# Patient Record
Sex: MALE | Race: WHITE | NOT HISPANIC OR LATINO | Employment: PART TIME | URBAN - NONMETROPOLITAN AREA
[De-identification: names, ages, dates, MRNs, and addresses within clinical notes are randomized per-mention and may not be internally consistent; named-entity substitution may affect disease eponyms.]

---

## 2018-03-01 ENCOUNTER — HISTORICAL (OUTPATIENT)
Dept: ADMINISTRATIVE | Facility: HOSPITAL | Age: 48
End: 2018-03-01

## 2018-06-15 ENCOUNTER — HISTORICAL (OUTPATIENT)
Dept: ADMINISTRATIVE | Facility: HOSPITAL | Age: 48
End: 2018-06-15

## 2018-07-03 ENCOUNTER — HISTORICAL (OUTPATIENT)
Dept: ADMINISTRATIVE | Facility: HOSPITAL | Age: 48
End: 2018-07-03

## 2019-10-06 ENCOUNTER — HISTORICAL (OUTPATIENT)
Dept: ADMINISTRATIVE | Facility: HOSPITAL | Age: 49
End: 2019-10-06

## 2022-05-01 ENCOUNTER — HISTORICAL (OUTPATIENT)
Dept: ADMINISTRATIVE | Facility: HOSPITAL | Age: 52
End: 2022-05-01

## 2022-05-08 ENCOUNTER — HISTORICAL (OUTPATIENT)
Dept: ADMINISTRATIVE | Facility: HOSPITAL | Age: 52
End: 2022-05-08

## 2022-05-09 ENCOUNTER — HISTORICAL (OUTPATIENT)
Dept: ADMINISTRATIVE | Facility: HOSPITAL | Age: 52
End: 2022-05-09

## 2022-05-11 ENCOUNTER — HISTORICAL (OUTPATIENT)
Dept: ADMINISTRATIVE | Facility: HOSPITAL | Age: 52
End: 2022-05-11

## 2023-06-12 ENCOUNTER — HOSPITAL ENCOUNTER (EMERGENCY)
Facility: HOSPITAL | Age: 53
Discharge: LEFT BEFORE DISCHARGE | End: 2023-06-12
Payer: MEDICAID

## 2023-06-12 VITALS
SYSTOLIC BLOOD PRESSURE: 121 MMHG | DIASTOLIC BLOOD PRESSURE: 72 MMHG | WEIGHT: 140 LBS | RESPIRATION RATE: 16 BRPM | HEIGHT: 65 IN | BODY MASS INDEX: 23.32 KG/M2 | TEMPERATURE: 98 F | OXYGEN SATURATION: 99 % | HEART RATE: 87 BPM

## 2023-06-12 DIAGNOSIS — R07.9 CHEST PAIN: ICD-10-CM

## 2023-06-12 DIAGNOSIS — F41.9 ANXIETY: Primary | ICD-10-CM

## 2023-06-12 LAB
ALBUMIN SERPL-MCNC: 4.3 G/DL (ref 3.4–5)
ALBUMIN/GLOB SERPL: 1.4 RATIO
ALP SERPL-CCNC: 87 UNIT/L (ref 50–144)
ALT SERPL-CCNC: 18 UNIT/L (ref 1–45)
ANION GAP SERPL CALC-SCNC: 3 MEQ/L (ref 2–13)
AST SERPL-CCNC: 27 UNIT/L (ref 17–59)
BASOPHILS # BLD AUTO: 0.05 X10(3)/MCL (ref 0.01–0.08)
BASOPHILS NFR BLD AUTO: 0.7 % (ref 0.1–1.2)
BILIRUBIN DIRECT+TOT PNL SERPL-MCNC: 0.5 MG/DL (ref 0–1)
BUN SERPL-MCNC: 11 MG/DL (ref 7–20)
CALCIUM SERPL-MCNC: 9.1 MG/DL (ref 8.4–10.2)
CHLORIDE SERPL-SCNC: 106 MMOL/L (ref 98–110)
CO2 SERPL-SCNC: 30 MMOL/L (ref 21–32)
CREAT SERPL-MCNC: 0.87 MG/DL (ref 0.66–1.25)
CREAT/UREA NIT SERPL: 13 (ref 12–20)
D DIMER PPP IA.FEU-MCNC: 0.29 MG/L (ref 0.19–0.5)
EOSINOPHIL # BLD AUTO: 0.09 X10(3)/MCL (ref 0.04–0.54)
EOSINOPHIL NFR BLD AUTO: 1.3 % (ref 0.7–7)
ERYTHROCYTE [DISTWIDTH] IN BLOOD BY AUTOMATED COUNT: 13.3 %
ETHANOL BLD-MCNC: <0.01 G/DL
ETHANOL SERPL-MCNC: <10 MG/DL
GFR SERPLBLD CREATININE-BSD FMLA CKD-EPI: >90 MLS/MIN/1.73/M2
GLOBULIN SER-MCNC: 3.1 GM/DL (ref 2–3.9)
GLUCOSE SERPL-MCNC: 109 MG/DL (ref 70–115)
HCT VFR BLD AUTO: 42.5 % (ref 36–52)
HGB BLD-MCNC: 13.9 G/DL (ref 13–18)
IMM GRANULOCYTES # BLD AUTO: 0 X10(3)/MCL (ref 0–0.03)
IMM GRANULOCYTES NFR BLD AUTO: 0 % (ref 0–0.5)
LYMPHOCYTES # BLD AUTO: 2.66 X10(3)/MCL (ref 1.32–3.57)
LYMPHOCYTES NFR BLD AUTO: 38.2 % (ref 20–55)
MCH RBC QN AUTO: 28.2 PG (ref 27–34)
MCHC RBC AUTO-ENTMCNC: 32.7 G/DL (ref 31–37)
MCV RBC AUTO: 86.2 FL (ref 79–99)
MONOCYTES # BLD AUTO: 0.59 X10(3)/MCL (ref 0.3–0.82)
MONOCYTES NFR BLD AUTO: 8.5 % (ref 4.7–12.5)
NEUTROPHILS # BLD AUTO: 3.57 X10(3)/MCL (ref 1.78–5.38)
NEUTROPHILS NFR BLD AUTO: 51.3 % (ref 37–73)
NRBC BLD AUTO-RTO: 0 %
PLATELET # BLD AUTO: 240 X10(3)/MCL (ref 140–371)
PMV BLD AUTO: 9.9 FL (ref 9.4–12.4)
POTASSIUM SERPL-SCNC: 3.6 MMOL/L (ref 3.5–5.1)
PROT SERPL-MCNC: 7.4 GM/DL (ref 6.3–8.2)
RBC # BLD AUTO: 4.93 X10(6)/MCL (ref 4–6)
SODIUM SERPL-SCNC: 139 MMOL/L (ref 135–145)
TROPONIN I SERPL-MCNC: <0.012 NG/ML (ref 0–0.03)
WBC # SPEC AUTO: 6.96 X10(3)/MCL (ref 4–11.5)

## 2023-06-12 PROCEDURE — 85025 COMPLETE CBC W/AUTO DIFF WBC: CPT

## 2023-06-12 PROCEDURE — 84484 ASSAY OF TROPONIN QUANT: CPT

## 2023-06-12 PROCEDURE — 93010 EKG 12-LEAD: ICD-10-PCS | Mod: ,,, | Performed by: INTERNAL MEDICINE

## 2023-06-12 PROCEDURE — 36415 COLL VENOUS BLD VENIPUNCTURE: CPT

## 2023-06-12 PROCEDURE — 82077 ASSAY SPEC XCP UR&BREATH IA: CPT

## 2023-06-12 PROCEDURE — 99285 EMERGENCY DEPT VISIT HI MDM: CPT | Mod: 25

## 2023-06-12 PROCEDURE — 93010 ELECTROCARDIOGRAM REPORT: CPT | Mod: ,,, | Performed by: INTERNAL MEDICINE

## 2023-06-12 PROCEDURE — 80053 COMPREHEN METABOLIC PANEL: CPT

## 2023-06-12 PROCEDURE — 85379 FIBRIN DEGRADATION QUANT: CPT

## 2023-06-12 PROCEDURE — 25000003 PHARM REV CODE 250

## 2023-06-12 PROCEDURE — 93005 ELECTROCARDIOGRAM TRACING: CPT

## 2023-06-12 RX ORDER — KETOROLAC TROMETHAMINE 30 MG/ML
60 INJECTION, SOLUTION INTRAMUSCULAR; INTRAVENOUS
Status: DISCONTINUED | OUTPATIENT
Start: 2023-06-12 | End: 2023-06-12

## 2023-06-12 RX ORDER — HYDROXYZINE PAMOATE 50 MG/1
50 CAPSULE ORAL
Status: COMPLETED | OUTPATIENT
Start: 2023-06-12 | End: 2023-06-12

## 2023-06-12 RX ADMIN — HYDROXYZINE PAMOATE 50 MG: 50 CAPSULE ORAL at 02:06

## 2023-06-12 NOTE — ED PROVIDER NOTES
Encounter Date: 6/12/2023       History     Chief Complaint   Patient presents with    Chest Pain    Loss of Consciousness     52-year-old male arrives via EMS.  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  He arrives still very anxious.  He has a long history of anxiety.  He denies any substance abuse.  There is no history of coronary artery disease or blood clots.  He was not ill before this.  There was no diaphoresis, nausea or vomiting.  He denies any shortness of breath.    The history is provided by the patient and the EMS personnel.   Review of patient's allergies indicates:   Allergen Reactions    Codeine     Penicillins     Tetracycline      Other reaction(s): O/E - respiratory distress     No past medical history on file.  No past surgical history on file.  No family history on file.     Review of Systems   Constitutional:  Negative for fever.   HENT:  Negative for sore throat.    Respiratory:  Positive for cough. Negative for shortness of breath.         Coughed up some phlegm after the near syncopal episode   Cardiovascular:  Positive for chest pain.   Gastrointestinal:  Negative for nausea.   Genitourinary:  Negative for dysuria.   Musculoskeletal:  Negative for back pain.   Skin:  Negative for rash.   Neurological:  Positive for weakness.        Suffered a near syncopal episode   Hematological:  Does not bruise/bleed easily.   Psychiatric/Behavioral:  The patient is nervous/anxious.    All other systems reviewed and are negative.    Physical Exam     Initial Vitals [06/12/23 0226]   BP Pulse Resp Temp SpO2   121/72 87 16 97.9 °F (36.6 °C) 99 %      MAP       --         Physical Exam    Nursing note and vitals reviewed.  Constitutional: Vital signs are normal. He appears well-developed and well-nourished. He is cooperative.   Thin male, appears older than stated age, appears quite anxious   HENT:   Head: Normocephalic and atraumatic.    Mouth/Throat: Oropharynx is clear and moist.   Eyes: Conjunctivae, EOM and lids are normal. Pupils are equal, round, and reactive to light.   Neck: Trachea normal. Neck supple.   Normal range of motion.  Cardiovascular:  Normal rate, regular rhythm, normal heart sounds and intact distal pulses.           Pulmonary/Chest: Breath sounds normal.   Abdominal: Abdomen is soft. Bowel sounds are normal.   Musculoskeletal:         General: Normal range of motion.      Cervical back: Normal, normal range of motion and neck supple.      Lumbar back: Normal.     Neurological: He is alert and oriented to person, place, and time. He has normal strength. Coordination normal.   Skin: Skin is warm, dry and intact. Capillary refill takes less than 2 seconds.   Psychiatric: His speech is normal and behavior is normal. Judgment and thought content normal. Cognition and memory are normal.   He is rather anxious upon arrival       ED Course   Procedures  Labs Reviewed   CBC WITH DIFFERENTIAL - Abnormal; Notable for the following components:       Result Value    IG# 0.00 (*)     All other components within normal limits   CBC W/ AUTO DIFFERENTIAL    Narrative:     The following orders were created for panel order CBC auto differential.  Procedure                               Abnormality         Status                     ---------                               -----------         ------                     CBC with Differential[[id number]]        Abnormal            Final result                 Please view results for these tests on the individual orders.   COMPREHENSIVE METABOLIC PANEL   TROPONIN I   D DIMER, QUANTITATIVE   DRUG SCREEN, URINE (BEAKER)   ALCOHOL,MEDICAL (ETHANOL)        ECG Results              EKG 12-lead (Preliminary result)  Result time 06/12/23 02:40:41      Wet Read by Martin Ortiz MD (06/12/23 02:40:41, [facility])    Normal sinus rhythm, heart rate 83, normal  axis, normal intervals, normal P waves, normal QRS, normal ST segments, normal T-waves.  This is a normal EKG.                                  Imaging Results              X-Ray Chest AP Portable (Preliminary result)  Result time 06/12/23 02:41:14      Wet Read by Martin Ortiz MD (06/12/23 02:41:14, [facility], Emergency Medicine)    Normal cardiac silhouette, clear lung fields, no pneumothorax                                     Medications   hydrOXYzine pamoate capsule 50 mg (50 mg Oral Given 6/12/23 0230)     Medical Decision Making:   Initial Assessment:   Chest pain, near-syncope, anxiety  Differential Diagnosis:   ACS, PE, panic attack, substance abuse  ED Management:  Cardiac workup, D-dimer, UDS  Vistaril po                        Clinical Impression:   Final diagnoses:  [R07.9] Chest pain  [F41.9] Anxiety (Primary)        ED Disposition Condition    AMA Stable                Martin Ortiz MD  06/12/23 0259

## 2023-06-16 ENCOUNTER — HOSPITAL ENCOUNTER (EMERGENCY)
Facility: HOSPITAL | Age: 53
Discharge: HOME | End: 2023-06-16
Attending: FAMILY MEDICINE
Payer: MEDICAID

## 2023-06-16 VITALS
TEMPERATURE: 97 F | OXYGEN SATURATION: 98 % | HEART RATE: 76 BPM | RESPIRATION RATE: 18 BRPM | SYSTOLIC BLOOD PRESSURE: 129 MMHG | DIASTOLIC BLOOD PRESSURE: 79 MMHG

## 2023-06-16 DIAGNOSIS — R06.00 DYSPNEA: ICD-10-CM

## 2023-06-16 DIAGNOSIS — T50.901A ACCIDENTAL OVERDOSE, INITIAL ENCOUNTER: Primary | ICD-10-CM

## 2023-06-16 DIAGNOSIS — R06.81 APNEA FOR GREATER THAN 15 SECONDS: ICD-10-CM

## 2023-06-16 LAB
ABS NEUT CALC (OHS): 3.54 X10(3)/MCL (ref 2.1–9.2)
ALBUMIN SERPL-MCNC: 4.5 G/DL (ref 3.4–5)
ALBUMIN/GLOB SERPL: 1.4 RATIO
ALP SERPL-CCNC: 95 UNIT/L (ref 50–144)
ALT SERPL-CCNC: 24 UNIT/L (ref 1–45)
ANION GAP SERPL CALC-SCNC: 8 MEQ/L (ref 2–13)
AST SERPL-CCNC: 38 UNIT/L (ref 17–59)
BASOPHILS NFR BLD MANUAL: 0.14 X10(3)/MCL (ref 0–0.2)
BASOPHILS NFR BLD MANUAL: 1 % (ref 0–2)
BILIRUBIN DIRECT+TOT PNL SERPL-MCNC: 0.5 MG/DL (ref 0–1)
BUN SERPL-MCNC: 15 MG/DL (ref 7–20)
CALCIUM SERPL-MCNC: 8.7 MG/DL (ref 8.4–10.2)
CHLORIDE SERPL-SCNC: 103 MMOL/L (ref 98–110)
CK SERPL-CCNC: 75 U/L (ref 55–170)
CO2 SERPL-SCNC: 29 MMOL/L (ref 21–32)
CREAT SERPL-MCNC: 0.83 MG/DL (ref 0.66–1.25)
CREAT/UREA NIT SERPL: 18 (ref 12–20)
EOSINOPHIL NFR BLD MANUAL: 0.14 X10(3)/MCL (ref 0–0.9)
EOSINOPHIL NFR BLD MANUAL: 1 % (ref 0–8)
ERYTHROCYTE [DISTWIDTH] IN BLOOD BY AUTOMATED COUNT: 13.2 %
GFR SERPLBLD CREATININE-BSD FMLA CKD-EPI: >90 MLS/MIN/1.73/M2
GLOBULIN SER-MCNC: 3.3 GM/DL (ref 2–3.9)
GLUCOSE SERPL-MCNC: 177 MG/DL (ref 70–115)
HCT VFR BLD AUTO: 45.7 % (ref 36–52)
HGB BLD-MCNC: 14.7 G/DL (ref 13–18)
IMM GRANULOCYTES # BLD AUTO: 0.04 X10(3)/MCL (ref 0–0.03)
IMM GRANULOCYTES NFR BLD AUTO: 0.3 % (ref 0–0.5)
LYMPHOCYTES NFR BLD MANUAL: 10.2 X10(3)/MCL
LYMPHOCYTES NFR BLD MANUAL: 72 % (ref 13–40)
MCH RBC QN AUTO: 28.4 PG (ref 27–34)
MCHC RBC AUTO-ENTMCNC: 32.2 G/DL (ref 31–37)
MCV RBC AUTO: 88.2 FL (ref 79–99)
MONOCYTES NFR BLD MANUAL: 0.14 X10(3)/MCL (ref 0.1–1.3)
MONOCYTES NFR BLD MANUAL: 1 % (ref 2–11)
NEUTROPHILS NFR BLD MANUAL: 25 % (ref 47–80)
NRBC BLD AUTO-RTO: 0 %
PLATELET # BLD AUTO: 292 X10(3)/MCL (ref 140–371)
PMV BLD AUTO: 10 FL (ref 9.4–12.4)
POTASSIUM SERPL-SCNC: 4.1 MMOL/L (ref 3.5–5.1)
PROT SERPL-MCNC: 7.8 GM/DL (ref 6.3–8.2)
RBC # BLD AUTO: 5.18 X10(6)/MCL (ref 4–6)
SODIUM SERPL-SCNC: 140 MMOL/L (ref 135–145)
WBC # SPEC AUTO: 14.16 X10(3)/MCL (ref 4–11.5)

## 2023-06-16 PROCEDURE — 85027 COMPLETE CBC AUTOMATED: CPT | Performed by: FAMILY MEDICINE

## 2023-06-16 PROCEDURE — 63600175 PHARM REV CODE 636 W HCPCS: Performed by: FAMILY MEDICINE

## 2023-06-16 PROCEDURE — 80053 COMPREHEN METABOLIC PANEL: CPT | Performed by: FAMILY MEDICINE

## 2023-06-16 PROCEDURE — 93010 EKG 12-LEAD: ICD-10-PCS | Mod: ,,, | Performed by: INTERNAL MEDICINE

## 2023-06-16 PROCEDURE — 82550 ASSAY OF CK (CPK): CPT | Performed by: FAMILY MEDICINE

## 2023-06-16 PROCEDURE — 96375 TX/PRO/DX INJ NEW DRUG ADDON: CPT

## 2023-06-16 PROCEDURE — 96374 THER/PROPH/DIAG INJ IV PUSH: CPT

## 2023-06-16 PROCEDURE — 99291 CRITICAL CARE FIRST HOUR: CPT

## 2023-06-16 PROCEDURE — 36415 COLL VENOUS BLD VENIPUNCTURE: CPT | Performed by: FAMILY MEDICINE

## 2023-06-16 PROCEDURE — 93005 ELECTROCARDIOGRAM TRACING: CPT

## 2023-06-16 PROCEDURE — 93010 ELECTROCARDIOGRAM REPORT: CPT | Mod: ,,, | Performed by: INTERNAL MEDICINE

## 2023-06-16 RX ORDER — NALOXONE HCL 0.4 MG/ML
2 VIAL (ML) INJECTION
Status: COMPLETED | OUTPATIENT
Start: 2023-06-16 | End: 2023-06-16

## 2023-06-16 RX ORDER — NALOXONE HYDROCHLORIDE 1 MG/ML
INJECTION INTRAMUSCULAR; INTRAVENOUS; SUBCUTANEOUS
Status: DISCONTINUED
Start: 2023-06-16 | End: 2023-06-17 | Stop reason: WASHOUT

## 2023-06-16 RX ORDER — NALOXONE HYDROCHLORIDE 1 MG/ML
INJECTION INTRAMUSCULAR; INTRAVENOUS; SUBCUTANEOUS
Status: DISCONTINUED
Start: 2023-06-16 | End: 2023-06-16 | Stop reason: HOSPADM

## 2023-06-16 RX ORDER — NALOXONE HYDROCHLORIDE 4 MG/.1ML
SPRAY NASAL
Qty: 1 EACH | Refills: 11 | Status: SHIPPED | OUTPATIENT
Start: 2023-06-16

## 2023-06-16 RX ORDER — NALOXONE HCL 0.4 MG/ML
VIAL (ML) INJECTION CODE/TRAUMA/SEDATION MEDICATION
Status: COMPLETED | OUTPATIENT
Start: 2023-06-16 | End: 2023-06-16

## 2023-06-16 RX ADMIN — NALOXONE HYDROCHLORIDE 2 MG: 0.4 INJECTION, SOLUTION INTRAMUSCULAR; INTRAVENOUS; SUBCUTANEOUS at 06:06

## 2023-06-16 NOTE — ED PROVIDER NOTES
"Encounter Date: 6/16/2023       History     Chief Complaint   Patient presents with    Drug Overdose    unresponsive     Pt found unresponsive in backseat of POV in ambulance bay.  Pt's friend stated "I don't know what happened".  Pt arrived pulseless and unable to breath on his own.  Code called and CPR started.       Patient dropped by associated with no history provided other than patient became unresponsive.  Patient arrives apneic, cyanotic.  His pupils are constricted.  Patient was immediately brought back, placed on cardiac monitor.  Pulse was palpated.  He was tachycardic.  Supplemental oxygen via bag mask valve was initiated.  Intravenous access was obtained through the external jugular and 2 mg of Narcan were given.  The patient had a prompt response, began breathing spontaneously, and became interactive.  After awakening from the Narcan, the patient states he has no suicidal ideations.  He states he is not a regular heroin user.  He states he has chronic back pain and shortness some heroin and took it today.      Review of patient's allergies indicates:   Allergen Reactions    Codeine     Penicillins     Tetracycline      Other reaction(s): O/E - respiratory distress     History reviewed. No pertinent past medical history.  History reviewed. No pertinent surgical history.  History reviewed. No pertinent family history.     Review of Systems   Constitutional:         Unresponsive   Respiratory:          Cyanotic, apneic   Skin:         diaphoretic   Neurological:         Unresponsive     Physical Exam     Initial Vitals   BP Pulse Resp Temp SpO2   06/16/23 1835 06/16/23 1835 06/16/23 1835 06/16/23 1835 06/16/23 2000   132/84 89 13 97.4 °F (36.3 °C) 98 %      MAP       --                Physical Exam    Constitutional:   Apneic, unresponsive, cyanotic   HENT:   Head: Normocephalic and atraumatic.   Eyes:   Pupils constricted   Cardiovascular:            Tachycardic, no m/g/r   Pulmonary/Chest:   apneic "   Abdominal: Abdomen is soft. Bowel sounds are normal.     Neurological:   unresponsive   Skin: Skin is warm and dry.       ED Course   Critical Care    Date/Time: 6/16/2023 6:24 PM  Performed by: Laureano Nelson MD  Authorized by: Laureano Nelson MD   Direct patient critical care time: 18 minutes  Additional history critical care time: 5 minutes  Ordering / reviewing critical care time: 10 minutes  Documentation critical care time: 10 minutes  Total critical care time (exclusive of procedural time) : 43 minutes  Critical care was necessary to treat or prevent imminent or life-threatening deterioration of the following conditions: respiratory failure and toxidrome.  Critical care was time spent personally by me on the following activities: development of treatment plan with patient or surrogate, interpretation of cardiac output measurements, evaluation of patient's response to treatment, examination of patient, obtaining history from patient or surrogate, ordering and performing treatments and interventions, ordering and review of laboratory studies, ordering and review of radiographic studies, pulse oximetry, re-evaluation of patient's condition and review of old charts.      Labs Reviewed   COMPREHENSIVE METABOLIC PANEL - Abnormal; Notable for the following components:       Result Value    Glucose Level 177 (*)     All other components within normal limits   CBC WITH DIFFERENTIAL - Abnormal; Notable for the following components:    WBC 14.16 (*)     IG# 0.04 (*)     All other components within normal limits   MANUAL DIFFERENTIAL - Abnormal; Notable for the following components:    Neutrophils % 25 (*)     Lymphs % 72 (*)     Monocytes % 1 (*)     Lymphs Abs 10.1952 (*)     All other components within normal limits   CK - Normal   CBC W/ AUTO DIFFERENTIAL    Narrative:     The following orders were created for panel order CBC auto differential.  Procedure                               Abnormality          Status                     ---------                               -----------         ------                     CBC with Differential[[id number]]        Abnormal            Final result               Manual Differential[[id number]]          Abnormal            Final result                 Please view results for these tests on the individual orders.     EKG Readings: (Independently Interpreted)   Initial Reading: No STEMI. Rhythm: Normal Sinus Rhythm. Heart Rate: 87. Ectopy: No Ectopy. ST Segments: Normal ST Segments. T Waves: Normal.   ECG Results              EKG 12-lead (Final result)  Result time 06/20/23 12:06:01      Final result by Interface, Lab In [facility] (06/20/23 12:06:01)                   Narrative:    Test Reason : R06.81,    Vent. Rate : 087 BPM     Atrial Rate : 087 BPM     P-R Int : 128 ms          QRS Dur : 080 ms      QT Int : 366 ms       P-R-T Axes : 071 054 045 degrees     QTc Int : 440 ms    Normal sinus rhythm  Normal ECG  When compared with ECG of 12-JUN-2023 02:31,  No significant change was found  Confirmed by Heriberto Rose MD ([id number]) on 6/20/2023 12:05:49 PM    Referred By: [email or web address]   SELF           Confirmed By:Heriberto Rose MD                                  Imaging Results              X-Ray Chest AP Portable (Final result)  Result time 06/16/23 20:14:27      Final result by Jimbo Rey III, MD (06/16/23 20:14:27)                   Impression:      1. I see no lobar or segmental infiltrates or other significant abnormalities.      Electronically signed by: Jimbo Rey  Date:    06/16/2023  Time:    20:14               Narrative:    EXAMINATION:  STUDY: XR CHEST AP PORTABLE    CLINICAL HISTORY AND TECHNIQUE:  RT Nicole on 6/16/2023  7:00 PM    CLINICAL HX: ER    PT FOUND UNRESPONSIVE TODAY    PT RESPONDED WELL TO NARCAN    PMH: CV-    TECH: 1 VIEW OF CHEST    TECHNOLOGIST: HOLLEY    COMPARISON:  06/12/2023    FINDINGS:  The cardiac, hilar, and mediastinal contours appear unremarkable.I  see no lobar or segmental infiltrates.No significant pleural effusions are noted.No significant musculoskeletal or vascular abnormalities are appreciated.                        Wet Read by Laureano Nelson MD (06/16/23 19:05:39, [facility], Emergency Medicine)    Neg for acute findings                                     Medications   naloxone 0.4 mg/mL injection (2 mg Intravenous Given 6/16/23 1831)   naloxone 0.4 mg/mL injection 2 mg (2 mg Nasal Given 6/16/23 1830)     Medical Decision Making:   Differential Diagnosis:   Intentional overdose, suicide attempt, anoxia,   Clinical Tests:   Lab Tests: Ordered and Reviewed  The following lab test(s) were unremarkable: CBC and CMP  Radiological Study: Ordered and Reviewed  Medical Tests: Ordered and Reviewed  ED Management:  Pt given d/c prescription for Narcan.  Referral made to substance use coordinator.                          Clinical Impression:   Final diagnoses:  [R06.00] Dyspnea  [R06.81] Apnea for greater than 15 seconds  [T50.901A] Accidental overdose, initial encounter (Primary)        ED Disposition Condition    Discharge Stable          ED Prescriptions       Medication Sig Dispense Start Date End Date Auth. Provider    naloxone (NARCAN) 4 mg/actuation Spry 4mg by nasal route as needed for opioid overdose; may repeat every 2-3 minutes in alternating nostrils until medical help arrives. Call 911 1 each 6/16/2023 -- Laureano Nelson MD          Follow-up Information    None          Laureano Nelson MD  06/16/23 2015       Laureano Nelson MD  06/30/23 1076     No significant past surgical history

## 2025-02-25 ENCOUNTER — HOSPITAL ENCOUNTER (INPATIENT)
Facility: HOSPITAL | Age: 55
LOS: 3 days | Discharge: HOME | DRG: 386 | End: 2025-02-28
Attending: EMERGENCY MEDICINE | Admitting: INTERNAL MEDICINE
Payer: MEDICAID

## 2025-02-25 DIAGNOSIS — K52.9 ACUTE HEMORRHAGIC COLITIS: Primary | ICD-10-CM

## 2025-02-25 DIAGNOSIS — K51.50 LEFT SIDED COLITIS WITHOUT COMPLICATIONS: ICD-10-CM

## 2025-02-25 DIAGNOSIS — R07.9 CHEST PAIN: ICD-10-CM

## 2025-02-25 DIAGNOSIS — R00.1 BRADYCARDIA: ICD-10-CM

## 2025-02-25 DIAGNOSIS — K92.2 LOWER GI BLEED: ICD-10-CM

## 2025-02-25 PROBLEM — F17.200 TOBACCO DEPENDENCY: Status: ACTIVE | Noted: 2025-02-25

## 2025-02-25 LAB
ALBUMIN SERPL-MCNC: 4.1 G/DL (ref 3.5–5)
ALBUMIN/GLOB SERPL: 1.2 RATIO (ref 1.1–2)
ALP SERPL-CCNC: 94 UNIT/L (ref 40–150)
ALT SERPL-CCNC: 7 UNIT/L (ref 0–55)
ANION GAP SERPL CALC-SCNC: 9 MEQ/L
AST SERPL-CCNC: 15 UNIT/L (ref 5–34)
BACTERIA #/AREA URNS AUTO: ABNORMAL /HPF
BASOPHILS # BLD AUTO: 0.05 X10(3)/MCL
BASOPHILS NFR BLD AUTO: 0.5 %
BILIRUB SERPL-MCNC: 1 MG/DL
BILIRUB UR QL STRIP.AUTO: ABNORMAL
BUN SERPL-MCNC: 8.1 MG/DL (ref 8.4–25.7)
CALCIUM SERPL-MCNC: 9.1 MG/DL (ref 8.4–10.2)
CHLORIDE SERPL-SCNC: 101 MMOL/L (ref 98–107)
CLARITY UR: CLEAR
CO2 SERPL-SCNC: 28 MMOL/L (ref 22–29)
COLOR UR AUTO: ABNORMAL
CREAT SERPL-MCNC: 0.85 MG/DL (ref 0.72–1.25)
CREAT/UREA NIT SERPL: 10
EOSINOPHIL # BLD AUTO: 0.07 X10(3)/MCL (ref 0–0.9)
EOSINOPHIL NFR BLD AUTO: 0.6 %
ERYTHROCYTE [DISTWIDTH] IN BLOOD BY AUTOMATED COUNT: 13.1 % (ref 11.5–17)
GFR SERPLBLD CREATININE-BSD FMLA CKD-EPI: >60 ML/MIN/1.73/M2
GLOBULIN SER-MCNC: 3.3 GM/DL (ref 2.4–3.5)
GLUCOSE SERPL-MCNC: 105 MG/DL (ref 74–100)
GLUCOSE UR QL STRIP: NEGATIVE
GROUP & RH: NORMAL
HCT VFR BLD AUTO: 48 % (ref 42–52)
HEMOCCULT SP1 STL QL: POSITIVE
HGB BLD-MCNC: 16 G/DL (ref 14–18)
HGB UR QL STRIP: NEGATIVE
IMM GRANULOCYTES # BLD AUTO: 0.02 X10(3)/MCL (ref 0–0.04)
IMM GRANULOCYTES NFR BLD AUTO: 0.2 %
INDIRECT COOMBS: NORMAL
INR PPP: 1.1 (ref 2–3)
KETONES UR QL STRIP: ABNORMAL
LEUKOCYTE ESTERASE UR QL STRIP: NEGATIVE
LIPASE SERPL-CCNC: 10 U/L
LYMPHOCYTES # BLD AUTO: 2.67 X10(3)/MCL (ref 0.6–4.6)
LYMPHOCYTES NFR BLD AUTO: 24.5 %
MCH RBC QN AUTO: 29.6 PG (ref 27–31)
MCHC RBC AUTO-ENTMCNC: 33.3 G/DL (ref 33–36)
MCV RBC AUTO: 88.7 FL (ref 80–94)
MONOCYTES # BLD AUTO: 0.74 X10(3)/MCL (ref 0.1–1.3)
MONOCYTES NFR BLD AUTO: 6.8 %
MUCOUS THREADS URNS QL MICRO: ABNORMAL /LPF
NEUTROPHILS # BLD AUTO: 7.34 X10(3)/MCL (ref 2.1–9.2)
NEUTROPHILS NFR BLD AUTO: 67.4 %
NITRITE UR QL STRIP: NEGATIVE
NRBC BLD AUTO-RTO: 0 %
PH UR STRIP: 6 [PH]
PLATELET # BLD AUTO: 225 X10(3)/MCL (ref 130–400)
PMV BLD AUTO: 9.9 FL (ref 7.4–10.4)
POTASSIUM SERPL-SCNC: 3.8 MMOL/L (ref 3.5–5.1)
PROT SERPL-MCNC: 7.4 GM/DL (ref 6.4–8.3)
PROT UR QL STRIP: ABNORMAL
PROTHROMBIN TIME: 14.7 SECONDS (ref 11.7–14.5)
RBC # BLD AUTO: 5.41 X10(6)/MCL (ref 4.7–6.1)
RBC #/AREA URNS AUTO: ABNORMAL /HPF
SODIUM SERPL-SCNC: 138 MMOL/L (ref 136–145)
SP GR UR STRIP.AUTO: >=1.03 (ref 1–1.03)
SPECIMEN OUTDATE: NORMAL
SQUAMOUS #/AREA URNS AUTO: ABNORMAL /HPF
UROBILINOGEN UR STRIP-ACNC: 0.2
WBC # BLD AUTO: 10.89 X10(3)/MCL (ref 4.5–11.5)
WBC #/AREA URNS AUTO: ABNORMAL /HPF

## 2025-02-25 PROCEDURE — 99285 EMERGENCY DEPT VISIT HI MDM: CPT | Mod: 25

## 2025-02-25 PROCEDURE — 86901 BLOOD TYPING SEROLOGIC RH(D): CPT | Performed by: EMERGENCY MEDICINE

## 2025-02-25 PROCEDURE — 83690 ASSAY OF LIPASE: CPT | Performed by: EMERGENCY MEDICINE

## 2025-02-25 PROCEDURE — 63600175 PHARM REV CODE 636 W HCPCS: Mod: JZ,TB | Performed by: EMERGENCY MEDICINE

## 2025-02-25 PROCEDURE — 80053 COMPREHEN METABOLIC PANEL: CPT | Performed by: EMERGENCY MEDICINE

## 2025-02-25 PROCEDURE — 81001 URINALYSIS AUTO W/SCOPE: CPT | Performed by: EMERGENCY MEDICINE

## 2025-02-25 PROCEDURE — 85610 PROTHROMBIN TIME: CPT | Performed by: EMERGENCY MEDICINE

## 2025-02-25 PROCEDURE — 25500020 PHARM REV CODE 255: Performed by: EMERGENCY MEDICINE

## 2025-02-25 PROCEDURE — 63600175 PHARM REV CODE 636 W HCPCS: Performed by: STUDENT IN AN ORGANIZED HEALTH CARE EDUCATION/TRAINING PROGRAM

## 2025-02-25 PROCEDURE — 82272 OCCULT BLD FECES 1-3 TESTS: CPT | Performed by: EMERGENCY MEDICINE

## 2025-02-25 PROCEDURE — 96365 THER/PROPH/DIAG IV INF INIT: CPT

## 2025-02-25 PROCEDURE — 25000003 PHARM REV CODE 250: Performed by: STUDENT IN AN ORGANIZED HEALTH CARE EDUCATION/TRAINING PROGRAM

## 2025-02-25 PROCEDURE — 25000003 PHARM REV CODE 250: Performed by: EMERGENCY MEDICINE

## 2025-02-25 PROCEDURE — 85025 COMPLETE CBC W/AUTO DIFF WBC: CPT | Performed by: EMERGENCY MEDICINE

## 2025-02-25 PROCEDURE — 11000001 HC ACUTE MED/SURG PRIVATE ROOM

## 2025-02-25 PROCEDURE — 96361 HYDRATE IV INFUSION ADD-ON: CPT

## 2025-02-25 RX ORDER — SODIUM CHLORIDE 0.9 % (FLUSH) 0.9 %
10 SYRINGE (ML) INJECTION
Status: DISCONTINUED | OUTPATIENT
Start: 2025-02-25 | End: 2025-02-28 | Stop reason: HOSPADM

## 2025-02-25 RX ORDER — IBUPROFEN 200 MG
24 TABLET ORAL
Status: DISCONTINUED | OUTPATIENT
Start: 2025-02-25 | End: 2025-02-28 | Stop reason: HOSPADM

## 2025-02-25 RX ORDER — SIMETHICONE 80 MG
1 TABLET,CHEWABLE ORAL 4 TIMES DAILY PRN
Status: DISCONTINUED | OUTPATIENT
Start: 2025-02-25 | End: 2025-02-28 | Stop reason: HOSPADM

## 2025-02-25 RX ORDER — GLUCAGON 1 MG
1 KIT INJECTION
Status: DISCONTINUED | OUTPATIENT
Start: 2025-02-25 | End: 2025-02-28 | Stop reason: HOSPADM

## 2025-02-25 RX ORDER — CIPROFLOXACIN 2 MG/ML
400 INJECTION, SOLUTION INTRAVENOUS
Status: DISCONTINUED | OUTPATIENT
Start: 2025-02-25 | End: 2025-02-25

## 2025-02-25 RX ORDER — ALUMINUM HYDROXIDE, MAGNESIUM HYDROXIDE, AND SIMETHICONE 1200; 120; 1200 MG/30ML; MG/30ML; MG/30ML
30 SUSPENSION ORAL 4 TIMES DAILY PRN
Status: DISCONTINUED | OUTPATIENT
Start: 2025-02-25 | End: 2025-02-28 | Stop reason: HOSPADM

## 2025-02-25 RX ORDER — TALC
6 POWDER (GRAM) TOPICAL NIGHTLY PRN
Status: DISCONTINUED | OUTPATIENT
Start: 2025-02-25 | End: 2025-02-25

## 2025-02-25 RX ORDER — ONDANSETRON HYDROCHLORIDE 8 MG/1
8 TABLET, FILM COATED ORAL 2 TIMES DAILY PRN
COMMUNITY
Start: 2025-01-14

## 2025-02-25 RX ORDER — CIPROFLOXACIN 2 MG/ML
400 INJECTION, SOLUTION INTRAVENOUS
Status: COMPLETED | OUTPATIENT
Start: 2025-02-25 | End: 2025-02-25

## 2025-02-25 RX ORDER — SODIUM CHLORIDE, SODIUM LACTATE, POTASSIUM CHLORIDE, CALCIUM CHLORIDE 600; 310; 30; 20 MG/100ML; MG/100ML; MG/100ML; MG/100ML
INJECTION, SOLUTION INTRAVENOUS CONTINUOUS
Status: ACTIVE | OUTPATIENT
Start: 2025-02-25 | End: 2025-02-26

## 2025-02-25 RX ORDER — SODIUM CHLORIDE 0.9 % (FLUSH) 0.9 %
10 SYRINGE (ML) INJECTION
Status: DISCONTINUED | OUTPATIENT
Start: 2025-02-25 | End: 2025-02-25

## 2025-02-25 RX ORDER — IBUPROFEN 200 MG
16 TABLET ORAL
Status: DISCONTINUED | OUTPATIENT
Start: 2025-02-25 | End: 2025-02-28 | Stop reason: HOSPADM

## 2025-02-25 RX ORDER — BISACODYL 10 MG/1
10 SUPPOSITORY RECTAL DAILY PRN
Status: DISCONTINUED | OUTPATIENT
Start: 2025-02-25 | End: 2025-02-28 | Stop reason: HOSPADM

## 2025-02-25 RX ORDER — ONDANSETRON HYDROCHLORIDE 2 MG/ML
4 INJECTION, SOLUTION INTRAVENOUS EVERY 8 HOURS PRN
Status: DISCONTINUED | OUTPATIENT
Start: 2025-02-25 | End: 2025-02-28 | Stop reason: HOSPADM

## 2025-02-25 RX ORDER — QUETIAPINE FUMARATE 100 MG/1
100 TABLET, FILM COATED ORAL NIGHTLY
Status: DISCONTINUED | OUTPATIENT
Start: 2025-02-25 | End: 2025-02-28 | Stop reason: HOSPADM

## 2025-02-25 RX ORDER — BUPRENORPHINE HYDROCHLORIDE AND NALOXONE HYDROCHLORIDE DIHYDRATE 2; .5 MG/1; MG/1
8 TABLET SUBLINGUAL 2 TIMES DAILY PRN
COMMUNITY

## 2025-02-25 RX ORDER — NALOXONE HCL 0.4 MG/ML
0.02 VIAL (ML) INJECTION
Status: DISCONTINUED | OUTPATIENT
Start: 2025-02-25 | End: 2025-02-28 | Stop reason: HOSPADM

## 2025-02-25 RX ORDER — METRONIDAZOLE 500 MG/100ML
500 INJECTION, SOLUTION INTRAVENOUS
Status: DISCONTINUED | OUTPATIENT
Start: 2025-02-25 | End: 2025-02-25

## 2025-02-25 RX ORDER — SODIUM CHLORIDE 9 MG/ML
1000 INJECTION, SOLUTION INTRAVENOUS
Status: DISCONTINUED | OUTPATIENT
Start: 2025-02-25 | End: 2025-02-25

## 2025-02-25 RX ORDER — GABAPENTIN 300 MG/1
300 CAPSULE ORAL NIGHTLY
Status: DISCONTINUED | OUTPATIENT
Start: 2025-02-26 | End: 2025-02-28 | Stop reason: HOSPADM

## 2025-02-25 RX ORDER — KETOROLAC TROMETHAMINE 30 MG/ML
30 INJECTION, SOLUTION INTRAMUSCULAR; INTRAVENOUS
Status: COMPLETED | OUTPATIENT
Start: 2025-02-25 | End: 2025-02-25

## 2025-02-25 RX ORDER — IBUPROFEN 200 MG
1 TABLET ORAL DAILY
Status: DISCONTINUED | OUTPATIENT
Start: 2025-02-26 | End: 2025-02-28 | Stop reason: HOSPADM

## 2025-02-25 RX ORDER — ACETAMINOPHEN 325 MG/1
650 TABLET ORAL EVERY 4 HOURS PRN
Status: DISCONTINUED | OUTPATIENT
Start: 2025-02-25 | End: 2025-02-28 | Stop reason: HOSPADM

## 2025-02-25 RX ORDER — POLYETHYLENE GLYCOL 3350 17 G/17G
17 POWDER, FOR SOLUTION ORAL 3 TIMES DAILY PRN
Status: DISCONTINUED | OUTPATIENT
Start: 2025-02-25 | End: 2025-02-28 | Stop reason: HOSPADM

## 2025-02-25 RX ORDER — METRONIDAZOLE 500 MG/100ML
500 INJECTION, SOLUTION INTRAVENOUS
Status: COMPLETED | OUTPATIENT
Start: 2025-02-25 | End: 2025-02-25

## 2025-02-25 RX ORDER — TALC
9 POWDER (GRAM) TOPICAL NIGHTLY PRN
Status: DISCONTINUED | OUTPATIENT
Start: 2025-02-25 | End: 2025-02-28 | Stop reason: HOSPADM

## 2025-02-25 RX ORDER — GABAPENTIN 300 MG/1
300 CAPSULE ORAL NIGHTLY
COMMUNITY
Start: 2025-01-14

## 2025-02-25 RX ORDER — IPRATROPIUM BROMIDE AND ALBUTEROL SULFATE 2.5; .5 MG/3ML; MG/3ML
3 SOLUTION RESPIRATORY (INHALATION) EVERY 6 HOURS PRN
Status: DISCONTINUED | OUTPATIENT
Start: 2025-02-25 | End: 2025-02-28 | Stop reason: HOSPADM

## 2025-02-25 RX ORDER — QUETIAPINE FUMARATE 100 MG/1
100 TABLET, FILM COATED ORAL NIGHTLY
COMMUNITY
Start: 2025-01-14

## 2025-02-25 RX ADMIN — CIPROFLOXACIN 400 MG: 2 INJECTION, SOLUTION INTRAVENOUS at 02:02

## 2025-02-25 RX ADMIN — AZITHROMYCIN MONOHYDRATE 500 MG: 500 INJECTION, POWDER, LYOPHILIZED, FOR SOLUTION INTRAVENOUS at 09:02

## 2025-02-25 RX ADMIN — SODIUM CHLORIDE, POTASSIUM CHLORIDE, SODIUM LACTATE AND CALCIUM CHLORIDE: 600; 310; 30; 20 INJECTION, SOLUTION INTRAVENOUS at 09:02

## 2025-02-25 RX ADMIN — IOHEXOL 100 ML: 350 INJECTION, SOLUTION INTRAVENOUS at 01:02

## 2025-02-25 RX ADMIN — METRONIDAZOLE 500 MG: 500 INJECTION, SOLUTION INTRAVENOUS at 03:02

## 2025-02-25 RX ADMIN — SODIUM CHLORIDE 1000 ML: 9 INJECTION, SOLUTION INTRAVENOUS at 01:02

## 2025-02-25 RX ADMIN — KETOROLAC TROMETHAMINE 30 MG: 30 INJECTION, SOLUTION INTRAMUSCULAR at 02:02

## 2025-02-25 NOTE — ED PROVIDER NOTES
Encounter Date: 2/25/2025       History     Chief Complaint   Patient presents with    Rectal Bleeding     HPI  54 year male here with c/o rectal bleeding in association with LLQ pain that began slowly over last week. He gives me hx of having stomach cramping for last week. Then yesterday at work had severe pain with normal and then loose bowel movement that was followed by blood. He passed dark blood without stool today while on the toilet and still had pain. He has hx of a gi bleed (says took a few NSAID last week but none recently and none on regular basiss). He also has hx of having a surgery on his colon due to bowel perforation from trauma about 10 years ago. He has never had a colonoscopy that he knows of. He denies alcohol use but does take suboxone. Smokes cigarettes daily  Review of patient's allergies indicates:   Allergen Reactions    Codeine     Penicillins     Tetracycline      Other reaction(s): O/E - respiratory distress     Past Medical History:   Diagnosis Date    Depression     GERD (gastroesophageal reflux disease)      Past Surgical History:   Procedure Laterality Date    ABDOMINAL SURGERY      APPENDECTOMY      finger amputations Right      No family history on file.  Social History[1]  Review of Systems   Constitutional:  Positive for activity change and fatigue.   HENT: Negative.     Respiratory: Negative.     Cardiovascular: Negative.    Gastrointestinal:  Positive for abdominal pain, blood in stool, diarrhea and vomiting.   Musculoskeletal: Negative.    Skin: Negative.    Neurological:  Positive for weakness.   All other systems reviewed and are negative.      Physical Exam     Initial Vitals [02/25/25 1142]   BP Pulse Resp Temp SpO2   (!) 147/78 78 16 98.1 °F (36.7 °C) 100 %      MAP       --         Physical Exam    Nursing note and vitals reviewed.  Constitutional:   Thin, small stature   HENT:   Head: Normocephalic and atraumatic.   Poor oral hygiene   Eyes: EOM are normal. Pupils are  equal, round, and reactive to light.   Cardiovascular:  Normal rate, regular rhythm and normal heart sounds.           Pulmonary/Chest: Breath sounds normal.   Abdominal:   Mdline scar below belly button, +tender LLQ and left of pubic midline region, no guarding / flat abdomen / no rebound but tender on deep palpation   Musculoskeletal:         General: Normal range of motion.     Neurological: He is alert and oriented to person, place, and time.   Skin: Skin is warm and dry.         ED Course   Procedures  Labs Reviewed   COMPREHENSIVE METABOLIC PANEL - Abnormal       Result Value    Sodium 138      Potassium 3.8      Chloride 101      CO2 28      Glucose 105 (*)     Blood Urea Nitrogen 8.1 (*)     Creatinine 0.85      Calcium 9.1      Protein Total 7.4      Albumin 4.1      Globulin 3.3      Albumin/Globulin Ratio 1.2      Bilirubin Total 1.0      ALP 94      ALT 7      AST 15      eGFR >60      Anion Gap 9.0      BUN/Creatinine Ratio 10     URINALYSIS, REFLEX TO URINE CULTURE - Abnormal    Color, UA Dark Yellow      Appearance, UA Clear      Specific Gravity, UA >=1.030      pH, UA 6.0      Protein, UA Trace (*)     Glucose, UA Negative      Ketones, UA Trace (*)     Blood, UA Negative      Bilirubin, UA Moderate (*)     Urobilinogen, UA 0.2      Nitrites, UA Negative      Leukocyte Esterase, UA Negative     PROTIME-INR - Abnormal    PT 14.7 (*)     INR 1.1 (*)     Narrative:     Protimes are used to monitor anticoagulant agents such as warfarin. PT INR values are based on the current patient normal mean and the ROMEO value for the specific instrument reagent used.  **Routine theraputic target values for the INR are 2.0-3.0**   OCCULT BLOOD, STOOL 1ST SPECIMEN - Abnormal    Occult Blood Stool 1 Positive (*)    URINALYSIS, MICROSCOPIC - Abnormal    Bacteria, UA Rare      Mucous, UA Many (*)     RBC, UA 0-2      WBC, UA 3-5      Squamous Epithelial Cells, UA Rare      Narrative:     Urine microscopic results may be  inaccurate, <12 cc sample submitted   LIPASE - Normal    Lipase Level 10     CBC W/ AUTO DIFFERENTIAL    Narrative:     The following orders were created for panel order CBC auto differential.  Procedure                               Abnormality         Status                     ---------                               -----------         ------                     CBC with Differential[[id number]]                           Final result                 Please view results for these tests on the individual orders.   CBC WITH DIFFERENTIAL    WBC 10.89      RBC 5.41      Hgb 16.0      Hct 48.0      MCV 88.7      MCH 29.6      MCHC 33.3      RDW 13.1      Platelet 225      MPV 9.9      Neut % 67.4      Lymph % 24.5      Mono % 6.8      Eos % 0.6      Basophil % 0.5      Imm Grans % 0.2      Neut # 7.34      Lymph # 2.67      Mono # 0.74      Eos # 0.07      Baso # 0.05      Imm Gran # 0.02      NRBC% 0.0     TYPE & SCREEN    Group & Rh O POS      Indirect Kal GEL NEG      Specimen Outdate 02/28/2025 23:59            Imaging Results              CT Abdomen Pelvis With IV Contrast NO Oral Contrast (Final result)  Result time 02/25/25 13:37:01      Final result by Yaneth Carrera MD (02/25/25 13:37:01)                   Impression:      Findings seen consistent with colitis involving the mid descending colon through the mid sigmoid colon      Electronically signed by: Mike Carrera  Date:    02/25/2025  Time:    13:37               Narrative:    EXAMINATION:  CT ABDOMEN PELVIS WITH IV CONTRAST    CLINICAL HISTORY:  Abdominal abscess/infection suspected;    TECHNIQUE:  Low dose axial images, sagittal and coronal reformations were obtained from the lung bases to the pubic symphysis following the IV administration of contrast. Automatic exposure control (AEC) is utilized to reduce patient radiation exposure.    COMPARISON:  None.    FINDINGS:  The lung bases are clear.    The liver appears normal.  No liver mass or  lesion is seen.  Portal and hepatic veins appear normal.    The gallbladder appears normal.  No obvious gallstones are seen.  No biliary dilatation is seen.  No pericholecystic fluid is seen.    The pancreas appears normal.  No pancreatic mass or lesion is seen.    The spleen shows no acute abnormality.    The adrenal glands appear normal.  No adrenal nodule is seen.    The kidneys appear normal.  No hydronephrosis is seen.  No hydroureter is seen.  No nephrolithiasis is seen.  No obvious ureteral stones are seen.    Urinary bladder appears grossly unremarkable.    There is colonic wall thickening seen extending from the mid descending colon to the sigmoid colon.  Small amount of fluid is seen along the pericolic gutter on the left side and some inflammatory changes are seen.  Findings are consistent with colitis..  No diverticulitis is seen.  No obvious colonic mass or lesion is seen.    No free air is seen.  No free fluid is seen.                                       Medications   metronidazole IVPB 500 mg ( Intravenous Verify Only 2/25/25 1531)   sodium chloride 0.9% bolus 1,000 mL 1,000 mL (0 mLs Intravenous Stopped 2/25/25 1417)   iohexoL (OMNIPAQUE 350) injection 100 mL (100 mLs Intravenous Given 2/25/25 1325)   ciprofloxacin (CIPRO)400mg/200ml D5W IVPB 400 mg (0 mg Intravenous Stopped 2/25/25 1524)   ketorolac injection 30 mg (30 mg Intravenous Given 2/25/25 1455)     Medical Decision Making  Differential would include SBO vs diveriticulitis vs gi bleed vs colon polyp vs mass  Medication list reviewed- suboxone BID  Social determinants of health and co-morbidities reviewed and on the chart.  Occult + / rectal showed blood on glove with clean vault per exam/ enlarged prostate  Cbc is stable and chemistry as well- however his ct shows colitis along sigmoid colon and with blood I am concerned and feel he would be best served with inpatient in event needs GI consult.   Fluids and antibioitics given here.  Plan  "will be to speak to Rick Dickerson NP about admission.      Amount and/or Complexity of Data Reviewed  Labs: ordered. Decision-making details documented in ED Course.  Radiology: ordered.    Risk  Prescription drug management.  Decision regarding hospitalization.                   Plan on admitting to medicine. Waiting call back from first on call.                 Clinical Impression: colitis vs diverticulitis  Final diagnoses:  [K52.9] Acute hemorrhagic colitis (Primary)  [K92.2] Lower GI bleed          ED Disposition Condition    Transfer to Another Facility Stable                    [1]   Social History  Tobacco Use    Smoking status: Every Day     Current packs/day: 0.50     Types: Cigarettes   Substance Use Topics    Alcohol use: Not Currently     Comment: in past ("over 1 year")    Drug use: Yes     Types: Marijuana        Nadya Ríos,   02/25/25 1541    "

## 2025-02-25 NOTE — ED TRIAGE NOTES
C/o low abd pain since last pm with + dark stools. + nausea. Hx of stomach ulcers. Multiple colon surgeries in past

## 2025-02-25 NOTE — Clinical Note
Diagnosis: Acute hemorrhagic colitis [239072]   Future Attending Provider: HALLE MITCHELL [[id number]]   Admit to which facility:: [facility] [[id number]]   Reason for IP Medical Treatment  (Clinical interventions that can only be accomplished in the IP setting? ) :: higher level of care

## 2025-02-26 LAB
ALBUMIN SERPL-MCNC: 3.2 G/DL (ref 3.5–5)
ALBUMIN/GLOB SERPL: 1.3 RATIO (ref 1.1–2)
ALP SERPL-CCNC: 72 UNIT/L (ref 40–150)
ALT SERPL-CCNC: <5 UNIT/L (ref 0–55)
ANION GAP SERPL CALC-SCNC: 6 MEQ/L
AST SERPL-CCNC: 12 UNIT/L (ref 5–34)
BASOPHILS # BLD AUTO: 0.07 X10(3)/MCL
BASOPHILS NFR BLD AUTO: 0.7 %
BILIRUB SERPL-MCNC: 0.8 MG/DL
BUN SERPL-MCNC: 6.3 MG/DL (ref 8.4–25.7)
CALCIUM SERPL-MCNC: 8.5 MG/DL (ref 8.4–10.2)
CHLORIDE SERPL-SCNC: 106 MMOL/L (ref 98–107)
CO2 SERPL-SCNC: 25 MMOL/L (ref 22–29)
CREAT SERPL-MCNC: 0.73 MG/DL (ref 0.72–1.25)
CREAT/UREA NIT SERPL: 9
EOSINOPHIL # BLD AUTO: 0.09 X10(3)/MCL (ref 0–0.9)
EOSINOPHIL NFR BLD AUTO: 1 %
ERYTHROCYTE [DISTWIDTH] IN BLOOD BY AUTOMATED COUNT: 13 % (ref 11.5–17)
GFR SERPLBLD CREATININE-BSD FMLA CKD-EPI: >60 ML/MIN/1.73/M2
GLOBULIN SER-MCNC: 2.5 GM/DL (ref 2.4–3.5)
GLUCOSE SERPL-MCNC: 94 MG/DL (ref 74–100)
HCT VFR BLD AUTO: 40.1 % (ref 42–52)
HGB BLD-MCNC: 13.3 G/DL (ref 14–18)
IMM GRANULOCYTES # BLD AUTO: 0.03 X10(3)/MCL (ref 0–0.04)
IMM GRANULOCYTES NFR BLD AUTO: 0.3 %
LYMPHOCYTES # BLD AUTO: 2.52 X10(3)/MCL (ref 0.6–4.6)
LYMPHOCYTES NFR BLD AUTO: 26.8 %
MCH RBC QN AUTO: 29 PG (ref 27–31)
MCHC RBC AUTO-ENTMCNC: 33.2 G/DL (ref 33–36)
MCV RBC AUTO: 87.6 FL (ref 80–94)
MONOCYTES # BLD AUTO: 0.6 X10(3)/MCL (ref 0.1–1.3)
MONOCYTES NFR BLD AUTO: 6.4 %
NEUTROPHILS # BLD AUTO: 6.09 X10(3)/MCL (ref 2.1–9.2)
NEUTROPHILS NFR BLD AUTO: 64.8 %
NRBC BLD AUTO-RTO: 0 %
PLATELET # BLD AUTO: 161 X10(3)/MCL (ref 130–400)
PMV BLD AUTO: 9.7 FL (ref 7.4–10.4)
POTASSIUM SERPL-SCNC: 3.5 MMOL/L (ref 3.5–5.1)
PROT SERPL-MCNC: 5.7 GM/DL (ref 6.4–8.3)
RBC # BLD AUTO: 4.58 X10(6)/MCL (ref 4.7–6.1)
SODIUM SERPL-SCNC: 137 MMOL/L (ref 136–145)
WBC # BLD AUTO: 9.4 X10(3)/MCL (ref 4.5–11.5)

## 2025-02-26 PROCEDURE — 63600175 PHARM REV CODE 636 W HCPCS: Performed by: STUDENT IN AN ORGANIZED HEALTH CARE EDUCATION/TRAINING PROGRAM

## 2025-02-26 PROCEDURE — 25000003 PHARM REV CODE 250

## 2025-02-26 PROCEDURE — 85025 COMPLETE CBC W/AUTO DIFF WBC: CPT | Performed by: EMERGENCY MEDICINE

## 2025-02-26 PROCEDURE — 80053 COMPREHEN METABOLIC PANEL: CPT | Performed by: EMERGENCY MEDICINE

## 2025-02-26 PROCEDURE — 25000003 PHARM REV CODE 250: Performed by: STUDENT IN AN ORGANIZED HEALTH CARE EDUCATION/TRAINING PROGRAM

## 2025-02-26 PROCEDURE — S4991 NICOTINE PATCH NONLEGEND: HCPCS | Performed by: STUDENT IN AN ORGANIZED HEALTH CARE EDUCATION/TRAINING PROGRAM

## 2025-02-26 PROCEDURE — 11000001 HC ACUTE MED/SURG PRIVATE ROOM

## 2025-02-26 PROCEDURE — 36415 COLL VENOUS BLD VENIPUNCTURE: CPT | Performed by: EMERGENCY MEDICINE

## 2025-02-26 RX ORDER — PANTOPRAZOLE SODIUM 40 MG/10ML
40 INJECTION, POWDER, LYOPHILIZED, FOR SOLUTION INTRAVENOUS 2 TIMES DAILY
Status: DISCONTINUED | OUTPATIENT
Start: 2025-02-26 | End: 2025-02-28 | Stop reason: HOSPADM

## 2025-02-26 RX ORDER — BUPRENORPHINE HYDROCHLORIDE 8 MG/1
8 TABLET SUBLINGUAL 2 TIMES DAILY
Status: DISCONTINUED | OUTPATIENT
Start: 2025-02-26 | End: 2025-02-28 | Stop reason: HOSPADM

## 2025-02-26 RX ORDER — KETOROLAC TROMETHAMINE 10 MG/1
10 TABLET, FILM COATED ORAL EVERY 4 HOURS PRN
Status: DISCONTINUED | OUTPATIENT
Start: 2025-02-26 | End: 2025-02-28 | Stop reason: HOSPADM

## 2025-02-26 RX ORDER — SODIUM CHLORIDE 9 MG/ML
INJECTION, SOLUTION INTRAVENOUS CONTINUOUS
Status: DISCONTINUED | OUTPATIENT
Start: 2025-02-26 | End: 2025-02-28

## 2025-02-26 RX ORDER — METRONIDAZOLE 500 MG/1
500 TABLET ORAL EVERY 8 HOURS
Status: DISCONTINUED | OUTPATIENT
Start: 2025-02-26 | End: 2025-02-28 | Stop reason: HOSPADM

## 2025-02-26 RX ORDER — CIPROFLOXACIN 2 MG/ML
400 INJECTION, SOLUTION INTRAVENOUS
Status: DISCONTINUED | OUTPATIENT
Start: 2025-02-26 | End: 2025-02-28

## 2025-02-26 RX ORDER — HYDROCODONE BITARTRATE AND ACETAMINOPHEN 5; 325 MG/1; MG/1
1 TABLET ORAL EVERY 6 HOURS PRN
Refills: 0 | Status: DISCONTINUED | OUTPATIENT
Start: 2025-02-26 | End: 2025-02-26

## 2025-02-26 RX ORDER — SODIUM, POTASSIUM,MAG SULFATES 17.5-3.13G
1 SOLUTION, RECONSTITUTED, ORAL ORAL 2 TIMES DAILY
Status: COMPLETED | OUTPATIENT
Start: 2025-02-27 | End: 2025-02-27

## 2025-02-26 RX ADMIN — BUPRENORPHINE 8 MG: 8 TABLET SUBLINGUAL at 09:02

## 2025-02-26 RX ADMIN — PANTOPRAZOLE SODIUM 40 MG: 40 INJECTION, POWDER, LYOPHILIZED, FOR SOLUTION INTRAVENOUS at 09:02

## 2025-02-26 RX ADMIN — NICOTINE 1 PATCH: 14 PATCH TRANSDERMAL at 09:02

## 2025-02-26 RX ADMIN — METRONIDAZOLE 500 MG: 500 TABLET ORAL at 09:02

## 2025-02-26 RX ADMIN — KETOROLAC TROMETHAMINE 10 MG: 10 TABLET, FILM COATED ORAL at 02:02

## 2025-02-26 RX ADMIN — METRONIDAZOLE 500 MG: 500 TABLET ORAL at 01:02

## 2025-02-26 RX ADMIN — GABAPENTIN 300 MG: 300 CAPSULE ORAL at 09:02

## 2025-02-26 RX ADMIN — CIPROFLOXACIN 400 MG: 2 INJECTION, SOLUTION INTRAVENOUS at 01:02

## 2025-02-26 RX ADMIN — PANTOPRAZOLE SODIUM 40 MG: 40 INJECTION, POWDER, LYOPHILIZED, FOR SOLUTION INTRAVENOUS at 02:02

## 2025-02-26 RX ADMIN — SODIUM CHLORIDE: 9 INJECTION, SOLUTION INTRAVENOUS at 03:02
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
## 2025-02-26 NOTE — PLAN OF CARE
Problem: Adult Inpatient Plan of Care  Goal: Plan of Care Review  2/25/2025 1912 by Mai Kyle RN  Outcome: Progressing  2/25/2025 1807 by Mai Kyle RN  Outcome: Progressing  Goal: Patient-Specific Goal (Individualized)  2/25/2025 1912 by Mai Kyle RN  Outcome: Progressing  2/25/2025 1807 by Mai Kyle RN  Outcome: Progressing  Goal: Absence of Hospital-Acquired Illness or Injury  2/25/2025 1912 by Mai Kyle RN  Outcome: Progressing  2/25/2025 1807 by Mai Kyle RN  Outcome: Progressing  Goal: Optimal Comfort and Wellbeing  2/25/2025 1912 by Mai Kyle RN  Outcome: Progressing  2/25/2025 1807 by Mai Kyle RN  Outcome: Progressing  Goal: Readiness for Transition of Care  2/25/2025 1912 by Mai Kyle RN  Outcome: Progressing  2/25/2025 1807 by Mai Kyle RN  Outcome: Progressing
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
## 2025-02-26 NOTE — PLAN OF CARE
Problem: Adult Inpatient Plan of Care  Goal: Plan of Care Review  2/26/2025 0831 by Narcisa Sequeira RN  Outcome: Progressing  2/26/2025 0830 by Narcisa Sequeira RN  Outcome: Progressing  Goal: Patient-Specific Goal (Individualized)  2/26/2025 0831 by Narcisa Sequeira RN  Outcome: Progressing  2/26/2025 0830 by Narcisa Sequeira RN  Outcome: Progressing  Goal: Absence of Hospital-Acquired Illness or Injury  2/26/2025 0831 by Narcisa Sequeira RN  Outcome: Progressing  2/26/2025 0830 by Narcisa Sequeira RN  Outcome: Progressing  Goal: Optimal Comfort and Wellbeing  2/26/2025 0831 by Narcisa Sequeira RN  Outcome: Progressing  2/26/2025 0830 by Narcisa Sequeira RN  Outcome: Progressing  Goal: Readiness for Transition of Care  2/26/2025 0831 by Narcisa Sequeira RN  Outcome: Progressing  2/26/2025 0830 by Narcisa Sequeira RN  Outcome: Progressing     Problem: Infection  Goal: Absence of Infection Signs and Symptoms  2/26/2025 0831 by Narcisa Sequeira RN  Outcome: Progressing  2/26/2025 0830 by Narcisa Sequeira RN  Outcome: Progressing     Problem: Fall Injury Risk  Goal: Absence of Fall and Fall-Related Injury  Outcome: Progressing

## 2025-02-26 NOTE — PLAN OF CARE
Pharmacy: [facility]  Pt states he recently (December 2024) was dc from [facility] and is currently living with his cousin Tyron in [place], [place]  Pt is ; has 3 adult children.  Pt states he saw Dr Domingo for mental health (1st apt this past Monday) on [address]  Pt states he will need a PCP at [place]. He states he heard Dr Null's office in [place] accepts Medicaid and would like to go there if poss.     02/26/25 0915   Discharge Assessment   Assessment Type Discharge Planning Assessment   Confirmed/corrected address, phone number and insurance Yes   Confirmed Demographics Correct on Facesheet  (I updated pt's address and phone # in epic))   Source of Information patient   When was your last doctors appointment?   (No PCP)   Communicated JOSSELYN with patient/caregiver Date not available/Unable to determine   Reason For Admission bloody diarrhea   People in Home other relative(s)   Do you expect to return to your current living situation? Yes   Prior to hospitilization cognitive status: Alert/Oriented   Current cognitive status: Alert/Oriented   Walking or Climbing Stairs Difficulty no   Dressing/Bathing Difficulty no   Home Layout Able to live on 1st floor   Equipment Currently Used at Home none   Readmission within 30 days? No   Patient currently being followed by outpatient case management? No   Do you currently have service(s) that help you manage your care at home? No   Do you take prescription medications? Yes   Do you have any problems affording any of your prescribed medications? No   Is the patient taking medications as prescribed? yes   Who is going to help you get home at discharge? alyssaana maria Ackerman [place] [phone]   How do you get to doctors appointments? family or friend will provide   Are you on dialysis? No   Do you take coumadin? No   Discharge Plan A Home   Discharge Plan B Home Health   DME Needed Upon Discharge  other (see comments)  (tbd)   Discharge Plan discussed with: Patient   Transition of  Care Barriers None   Financial Resource Strain   How hard is it for you to pay for the very basics like food, housing, medical care, and heating? Somewhat   Housing Stability   In the last 12 months, was there a time when you were not able to pay the mortgage or rent on time? N   At any time in the past 12 months, were you homeless or living in a shelter (including now)? N   Transportation Needs   Has the lack of transportation kept you from medical appointments, meetings, work or from getting things needed for daily living? No   Food Insecurity   Within the past 12 months, you worried that your food would run out before you got the money to buy more. Sometimes   Within the past 12 months, the food you bought just didn't last and you didn't have money to get more. Sometimes   Stress   Do you feel stress - tense, restless, nervous, or anxious, or unable to sleep at night because your mind is troubled all the time - these days? Rather much   Social Isolation   How often do you feel lonely or isolated from those around you?  Sometimes   Alcohol Use   Q1: How often do you have a drink containing alcohol? Never   Q2: How many drinks containing alcohol do you have on a typical day when you are drinking? None   Q3: How often do you have six or more drinks on one occasion? Never   Utilities   In the past 12 months has the electric, gas, oil, or water company threatened to shut off services in your home? No   Health Literacy   How often do you need to have someone help you when you read instructions, pamphlets, or other written material from your doctor or pharmacy? Never   OTHER   Name(s) of People in Home Tyron Malagon (cousin)

## 2025-02-27 ENCOUNTER — ANESTHESIA EVENT (OUTPATIENT)
Dept: ENDOSCOPY | Facility: HOSPITAL | Age: 55
End: 2025-02-27
Payer: MEDICAID

## 2025-02-27 LAB
ADV 40+41 DNA STL QL NAA+NON-PROBE: NOT DETECTED
ALBUMIN SERPL-MCNC: 3 G/DL (ref 3.5–5)
ALBUMIN/GLOB SERPL: 1.3 RATIO (ref 1.1–2)
ALP SERPL-CCNC: 67 UNIT/L (ref 40–150)
ALT SERPL-CCNC: <5 UNIT/L (ref 0–55)
ANION GAP SERPL CALC-SCNC: 6 MEQ/L
AST SERPL-CCNC: 12 UNIT/L (ref 5–34)
ASTRO TYP 1-8 RNA STL QL NAA+NON-PROBE: NOT DETECTED
BASOPHILS # BLD AUTO: 0.08 X10(3)/MCL
BASOPHILS NFR BLD AUTO: 1.2 %
BILIRUB SERPL-MCNC: 0.6 MG/DL
BUN SERPL-MCNC: 7.7 MG/DL (ref 8.4–25.7)
C CAYETANENSIS DNA STL QL NAA+NON-PROBE: NOT DETECTED
C COLI+JEJ+UPSA DNA STL QL NAA+NON-PROBE: NOT DETECTED
CALCIUM SERPL-MCNC: 8.4 MG/DL (ref 8.4–10.2)
CHLORIDE SERPL-SCNC: 108 MMOL/L (ref 98–107)
CO2 SERPL-SCNC: 26 MMOL/L (ref 22–29)
CREAT SERPL-MCNC: 0.76 MG/DL (ref 0.72–1.25)
CREAT/UREA NIT SERPL: 10
CRYPTOSP DNA STL QL NAA+NON-PROBE: NOT DETECTED
E HISTOLYT DNA STL QL NAA+NON-PROBE: NOT DETECTED
EAEC PAA PLAS AGGR+AATA ST NAA+NON-PRB: NOT DETECTED
EC STX1+STX2 GENES STL QL NAA+NON-PROBE: NOT DETECTED
EOSINOPHIL # BLD AUTO: 0.11 X10(3)/MCL (ref 0–0.9)
EOSINOPHIL NFR BLD AUTO: 1.6 %
EPEC EAE GENE STL QL NAA+NON-PROBE: NOT DETECTED
ERYTHROCYTE [DISTWIDTH] IN BLOOD BY AUTOMATED COUNT: 12.5 % (ref 11.5–17)
ETEC LTA+ST1A+ST1B TOX ST NAA+NON-PROBE: NOT DETECTED
G LAMBLIA DNA STL QL NAA+NON-PROBE: NOT DETECTED
GFR SERPLBLD CREATININE-BSD FMLA CKD-EPI: >60 ML/MIN/1.73/M2
GLOBULIN SER-MCNC: 2.3 GM/DL (ref 2.4–3.5)
GLUCOSE SERPL-MCNC: 89 MG/DL (ref 74–100)
HCT VFR BLD AUTO: 38 % (ref 42–52)
HGB BLD-MCNC: 12.8 G/DL (ref 14–18)
IMM GRANULOCYTES # BLD AUTO: 0.01 X10(3)/MCL (ref 0–0.04)
IMM GRANULOCYTES NFR BLD AUTO: 0.1 %
LYMPHOCYTES # BLD AUTO: 2.54 X10(3)/MCL (ref 0.6–4.6)
LYMPHOCYTES NFR BLD AUTO: 37.4 %
MAGNESIUM SERPL-MCNC: 1.7 MG/DL (ref 1.6–2.6)
MCH RBC QN AUTO: 29.4 PG (ref 27–31)
MCHC RBC AUTO-ENTMCNC: 33.7 G/DL (ref 33–36)
MCV RBC AUTO: 87.4 FL (ref 80–94)
MONOCYTES # BLD AUTO: 0.49 X10(3)/MCL (ref 0.1–1.3)
MONOCYTES NFR BLD AUTO: 7.2 %
NEUTROPHILS # BLD AUTO: 3.56 X10(3)/MCL (ref 2.1–9.2)
NEUTROPHILS NFR BLD AUTO: 52.5 %
NOROVIRUS GI+II RNA STL QL NAA+NON-PROBE: NOT DETECTED
NRBC BLD AUTO-RTO: 0 %
P SHIGELLOIDES DNA STL QL NAA+NON-PROBE: NOT DETECTED
PLATELET # BLD AUTO: 164 X10(3)/MCL (ref 130–400)
PMV BLD AUTO: 10.1 FL (ref 7.4–10.4)
POTASSIUM SERPL-SCNC: 3.8 MMOL/L (ref 3.5–5.1)
PROT SERPL-MCNC: 5.3 GM/DL (ref 6.4–8.3)
RBC # BLD AUTO: 4.35 X10(6)/MCL (ref 4.7–6.1)
RVA RNA STL QL NAA+NON-PROBE: NOT DETECTED
S ENT+BONG DNA STL QL NAA+NON-PROBE: NOT DETECTED
SAPO I+II+IV+V RNA STL QL NAA+NON-PROBE: NOT DETECTED
SHIGELLA SP+EIEC IPAH ST NAA+NON-PROBE: NOT DETECTED
SODIUM SERPL-SCNC: 140 MMOL/L (ref 136–145)
V CHOL+PARA+VUL DNA STL QL NAA+NON-PROBE: NOT DETECTED
V CHOLERAE DNA STL QL NAA+NON-PROBE: NOT DETECTED
WBC # BLD AUTO: 6.79 X10(3)/MCL (ref 4.5–11.5)
Y ENTEROCOL DNA STL QL NAA+NON-PROBE: NOT DETECTED

## 2025-02-27 PROCEDURE — 80053 COMPREHEN METABOLIC PANEL: CPT | Performed by: STUDENT IN AN ORGANIZED HEALTH CARE EDUCATION/TRAINING PROGRAM

## 2025-02-27 PROCEDURE — 25000003 PHARM REV CODE 250

## 2025-02-27 PROCEDURE — 63600175 PHARM REV CODE 636 W HCPCS: Performed by: STUDENT IN AN ORGANIZED HEALTH CARE EDUCATION/TRAINING PROGRAM

## 2025-02-27 PROCEDURE — 63600175 PHARM REV CODE 636 W HCPCS: Performed by: EMERGENCY MEDICINE

## 2025-02-27 PROCEDURE — 25000003 PHARM REV CODE 250: Performed by: STUDENT IN AN ORGANIZED HEALTH CARE EDUCATION/TRAINING PROGRAM

## 2025-02-27 PROCEDURE — 87507 IADNA-DNA/RNA PROBE TQ 12-25: CPT | Performed by: STUDENT IN AN ORGANIZED HEALTH CARE EDUCATION/TRAINING PROGRAM

## 2025-02-27 PROCEDURE — 86318 IA INFECTIOUS AGENT ANTIBODY: CPT | Performed by: STUDENT IN AN ORGANIZED HEALTH CARE EDUCATION/TRAINING PROGRAM

## 2025-02-27 PROCEDURE — 85025 COMPLETE CBC W/AUTO DIFF WBC: CPT | Performed by: STUDENT IN AN ORGANIZED HEALTH CARE EDUCATION/TRAINING PROGRAM

## 2025-02-27 PROCEDURE — 36415 COLL VENOUS BLD VENIPUNCTURE: CPT | Performed by: STUDENT IN AN ORGANIZED HEALTH CARE EDUCATION/TRAINING PROGRAM

## 2025-02-27 PROCEDURE — 11000001 HC ACUTE MED/SURG PRIVATE ROOM

## 2025-02-27 PROCEDURE — 25000003 PHARM REV CODE 250: Performed by: INTERNAL MEDICINE

## 2025-02-27 PROCEDURE — S4991 NICOTINE PATCH NONLEGEND: HCPCS | Performed by: STUDENT IN AN ORGANIZED HEALTH CARE EDUCATION/TRAINING PROGRAM

## 2025-02-27 PROCEDURE — 83735 ASSAY OF MAGNESIUM: CPT | Performed by: STUDENT IN AN ORGANIZED HEALTH CARE EDUCATION/TRAINING PROGRAM

## 2025-02-27 PROCEDURE — 87427 SHIGA-LIKE TOXIN AG IA: CPT | Performed by: STUDENT IN AN ORGANIZED HEALTH CARE EDUCATION/TRAINING PROGRAM

## 2025-02-27 RX ORDER — LIDOCAINE HYDROCHLORIDE 10 MG/ML
1 INJECTION, SOLUTION EPIDURAL; INFILTRATION; INTRACAUDAL; PERINEURAL ONCE
OUTPATIENT
Start: 2025-02-27 | End: 2025-02-27

## 2025-02-27 RX ADMIN — METRONIDAZOLE 500 MG: 500 TABLET ORAL at 08:02

## 2025-02-27 RX ADMIN — ONDANSETRON 4 MG: 2 INJECTION INTRAMUSCULAR; INTRAVENOUS at 11:02

## 2025-02-27 RX ADMIN — METRONIDAZOLE 500 MG: 500 TABLET ORAL at 06:02

## 2025-02-27 RX ADMIN — SODIUM CHLORIDE: 9 INJECTION, SOLUTION INTRAVENOUS at 11:02

## 2025-02-27 RX ADMIN — KETOROLAC TROMETHAMINE 10 MG: 10 TABLET, FILM COATED ORAL at 08:02

## 2025-02-27 RX ADMIN — PANTOPRAZOLE SODIUM 40 MG: 40 INJECTION, POWDER, LYOPHILIZED, FOR SOLUTION INTRAVENOUS at 08:02

## 2025-02-27 RX ADMIN — SODIUM SULFATE, POTASSIUM SULFATE, MAGNESIUM SULFATE 177 ML: 17.5; 3.13; 1.6 SOLUTION, CONCENTRATE ORAL at 03:02

## 2025-02-27 RX ADMIN — CIPROFLOXACIN 400 MG: 2 INJECTION, SOLUTION INTRAVENOUS at 02:02

## 2025-02-27 RX ADMIN — CIPROFLOXACIN 400 MG: 2 INJECTION, SOLUTION INTRAVENOUS at 03:02

## 2025-02-27 RX ADMIN — BUPRENORPHINE 8 MG: 8 TABLET SUBLINGUAL at 08:02

## 2025-02-27 RX ADMIN — NICOTINE 1 PATCH: 14 PATCH TRANSDERMAL at 08:02

## 2025-02-27 RX ADMIN — METRONIDAZOLE 500 MG: 500 TABLET ORAL at 02:02

## 2025-02-27 RX ADMIN — SODIUM SULFATE, POTASSIUM SULFATE, MAGNESIUM SULFATE 177 ML: 17.5; 3.13; 1.6 SOLUTION, CONCENTRATE ORAL at 09:02

## 2025-02-27 RX ADMIN — GABAPENTIN 300 MG: 300 CAPSULE ORAL at 08:02
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
## 2025-02-27 NOTE — ANESTHESIA PREPROCEDURE EVALUATION
02/27/2025  Juan Malagon is a 54 y.o., male., who presents for the following:    Procedure: COLON (Abdomen)   Anesthesia type: General/MAC   Diagnosis: Left sided colitis without complications [K51.50]   Location: [facility] ENDO 03 / [facility] ENDOSCOPY   Surgeons: Andriy Collado MD       Pre-op Assessment    I have reviewed the Patient Summary Reports.     I have reviewed the Nursing Notes. I have reviewed the NPO Status.   I have reviewed the Medications.     Review of Systems  Anesthesia Hx:  No problems with previous Anesthesia             Denies Family Hx of Anesthesia complications.    Denies Personal Hx of Anesthesia complications.                    Social:  Smoker, Recreational Drugs + Marijuana use      Cardiovascular:  Cardiovascular Normal                                              Pulmonary:  Pulmonary Normal                       Hepatic/GI:     GERD                Endocrine:  Endocrine Normal            Psych:    depression                Physical Exam  General: Alert and Oriented    Airway:  Mallampati: II   Mouth Opening: Normal  TM Distance: 4 - 6 cm  Tongue: Normal  Neck ROM: Normal ROM    Dental:  Age Related Wear  Chest/Lungs:  Normal Respiratory Rate    Heart:  Rate: Normal  Rhythm: Regular Rhythm        Anesthesia Plan  Type of Anesthesia, risks & benefits discussed:    Anesthesia Type: Gen Natural Airway  Intra-op Monitoring Plan: Standard ASA Monitors  Post Op Pain Control Plan: IV/PO Opioids PRN  Induction:  IV  Airway Plan: Direct  Informed Consent: Informed consent signed with the Patient and all parties understand the risks and agree with anesthesia plan.  All questions answered. Patient consented to blood products? No  ASA Score: 2  Day of Surgery Review of History & Physical: H&P Update referred to the surgeon/provider.  Anesthesia Plan Notes: Nasal cannula vs facemask  supplemental oxygenation         Ready For Surgery From Anesthesia Perspective.     .

## 2025-02-27 NOTE — PLAN OF CARE
Problem: Adult Inpatient Plan of Care  Goal: Plan of Care Review  Outcome: Progressing  Goal: Patient-Specific Goal (Individualized)  Outcome: Progressing  Goal: Absence of Hospital-Acquired Illness or Injury  Outcome: Progressing  Goal: Optimal Comfort and Wellbeing  Outcome: Progressing  Goal: Readiness for Transition of Care  Outcome: Progressing     Problem: Infection  Goal: Absence of Infection Signs and Symptoms  Outcome: Progressing     Problem: Fall Injury Risk  Goal: Absence of Fall and Fall-Related Injury  Outcome: Progressing     Problem: Wound  Goal: Optimal Coping  Outcome: Progressing  Goal: Optimal Functional Ability  Outcome: Progressing  Goal: Absence of Infection Signs and Symptoms  Outcome: Progressing  Goal: Improved Oral Intake  Outcome: Progressing  Goal: Optimal Pain Control and Function  Outcome: Progressing  Goal: Skin Health and Integrity  Outcome: Progressing  Goal: Optimal Wound Healing  Outcome: Progressing
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
## 2025-02-28 ENCOUNTER — ANESTHESIA (OUTPATIENT)
Dept: ENDOSCOPY | Facility: HOSPITAL | Age: 55
End: 2025-02-28
Payer: MEDICAID

## 2025-02-28 VITALS
SYSTOLIC BLOOD PRESSURE: 156 MMHG | WEIGHT: 136 LBS | RESPIRATION RATE: 15 BRPM | TEMPERATURE: 98 F | OXYGEN SATURATION: 98 % | DIASTOLIC BLOOD PRESSURE: 95 MMHG | HEART RATE: 92 BPM | HEIGHT: 65 IN | BODY MASS INDEX: 22.66 KG/M2

## 2025-02-28 LAB
ALBUMIN SERPL-MCNC: 3.6 G/DL (ref 3.5–5)
ALBUMIN/GLOB SERPL: 1.2 RATIO (ref 1.1–2)
ALP SERPL-CCNC: 74 UNIT/L (ref 40–150)
ALT SERPL-CCNC: 7 UNIT/L (ref 0–55)
ANION GAP SERPL CALC-SCNC: 12 MEQ/L
AST SERPL-CCNC: 28 UNIT/L (ref 5–34)
BASOPHILS # BLD AUTO: 0.06 X10(3)/MCL
BASOPHILS NFR BLD AUTO: 1.3 %
BILIRUB SERPL-MCNC: 0.5 MG/DL
BUN SERPL-MCNC: 4.5 MG/DL (ref 8.4–25.7)
C DIFF TOX A+B STL QL IA: NEGATIVE
CALCIUM SERPL-MCNC: 8.9 MG/DL (ref 8.4–10.2)
CHLORIDE SERPL-SCNC: 107 MMOL/L (ref 98–107)
CLOSTRIDIUM DIFFICILE GDH ANTIGEN (OHS): NEGATIVE
CO2 SERPL-SCNC: 23 MMOL/L (ref 22–29)
CREAT SERPL-MCNC: 0.79 MG/DL (ref 0.72–1.25)
CREAT/UREA NIT SERPL: 6
EOSINOPHIL # BLD AUTO: 0.06 X10(3)/MCL (ref 0–0.9)
EOSINOPHIL NFR BLD AUTO: 1.3 %
ERYTHROCYTE [DISTWIDTH] IN BLOOD BY AUTOMATED COUNT: 12.5 % (ref 11.5–17)
GFR SERPLBLD CREATININE-BSD FMLA CKD-EPI: >60 ML/MIN/1.73/M2
GLOBULIN SER-MCNC: 3 GM/DL (ref 2.4–3.5)
GLUCOSE SERPL-MCNC: 92 MG/DL (ref 74–100)
HCT VFR BLD AUTO: 43.2 % (ref 42–52)
HGB BLD-MCNC: 14.8 G/DL (ref 14–18)
IMM GRANULOCYTES # BLD AUTO: 0.01 X10(3)/MCL (ref 0–0.04)
IMM GRANULOCYTES NFR BLD AUTO: 0.2 %
LYMPHOCYTES # BLD AUTO: 1.86 X10(3)/MCL (ref 0.6–4.6)
LYMPHOCYTES NFR BLD AUTO: 39.7 %
MCH RBC QN AUTO: 29.4 PG (ref 27–31)
MCHC RBC AUTO-ENTMCNC: 34.3 G/DL (ref 33–36)
MCV RBC AUTO: 85.9 FL (ref 80–94)
MONOCYTES # BLD AUTO: 0.36 X10(3)/MCL (ref 0.1–1.3)
MONOCYTES NFR BLD AUTO: 7.7 %
NEUTROPHILS # BLD AUTO: 2.33 X10(3)/MCL (ref 2.1–9.2)
NEUTROPHILS NFR BLD AUTO: 49.8 %
NRBC BLD AUTO-RTO: 0 %
OHS QRS DURATION: 80 MS
OHS QTC CALCULATION: 407 MS
PLATELET # BLD AUTO: 179 X10(3)/MCL (ref 130–400)
PMV BLD AUTO: 10.3 FL (ref 7.4–10.4)
POTASSIUM SERPL-SCNC: 3.9 MMOL/L (ref 3.5–5.1)
PROT SERPL-MCNC: 6.6 GM/DL (ref 6.4–8.3)
RBC # BLD AUTO: 5.03 X10(6)/MCL (ref 4.7–6.1)
SODIUM SERPL-SCNC: 142 MMOL/L (ref 136–145)
WBC # BLD AUTO: 4.68 X10(3)/MCL (ref 4.5–11.5)

## 2025-02-28 PROCEDURE — 37000009 HC ANESTHESIA EA ADD 15 MINS: Performed by: INTERNAL MEDICINE

## 2025-02-28 PROCEDURE — 88313 SPECIAL STAINS GROUP 2: CPT

## 2025-02-28 PROCEDURE — 25000003 PHARM REV CODE 250: Performed by: STUDENT IN AN ORGANIZED HEALTH CARE EDUCATION/TRAINING PROGRAM

## 2025-02-28 PROCEDURE — 37000008 HC ANESTHESIA 1ST 15 MINUTES: Performed by: INTERNAL MEDICINE

## 2025-02-28 PROCEDURE — C1769 GUIDE WIRE: HCPCS | Performed by: INTERNAL MEDICINE

## 2025-02-28 PROCEDURE — 36415 COLL VENOUS BLD VENIPUNCTURE: CPT | Performed by: STUDENT IN AN ORGANIZED HEALTH CARE EDUCATION/TRAINING PROGRAM

## 2025-02-28 PROCEDURE — 63600175 PHARM REV CODE 636 W HCPCS: Performed by: STUDENT IN AN ORGANIZED HEALTH CARE EDUCATION/TRAINING PROGRAM

## 2025-02-28 PROCEDURE — 45380 COLONOSCOPY AND BIOPSY: CPT | Performed by: INTERNAL MEDICINE

## 2025-02-28 PROCEDURE — 85025 COMPLETE CBC W/AUTO DIFF WBC: CPT | Performed by: STUDENT IN AN ORGANIZED HEALTH CARE EDUCATION/TRAINING PROGRAM

## 2025-02-28 PROCEDURE — 63600175 PHARM REV CODE 636 W HCPCS: Mod: JZ,TB | Performed by: NURSE ANESTHETIST, CERTIFIED REGISTERED

## 2025-02-28 PROCEDURE — 93005 ELECTROCARDIOGRAM TRACING: CPT

## 2025-02-28 PROCEDURE — 25000003 PHARM REV CODE 250: Performed by: NURSE ANESTHETIST, CERTIFIED REGISTERED

## 2025-02-28 PROCEDURE — S4991 NICOTINE PATCH NONLEGEND: HCPCS | Performed by: STUDENT IN AN ORGANIZED HEALTH CARE EDUCATION/TRAINING PROGRAM

## 2025-02-28 PROCEDURE — 0DBP8ZX EXCISION OF RECTUM, VIA NATURAL OR ARTIFICIAL OPENING ENDOSCOPIC, DIAGNOSTIC: ICD-10-PCS | Performed by: INTERNAL MEDICINE

## 2025-02-28 PROCEDURE — 27201423 OPTIME MED/SURG SUP & DEVICES STERILE SUPPLY: Performed by: INTERNAL MEDICINE

## 2025-02-28 PROCEDURE — 93010 ELECTROCARDIOGRAM REPORT: CPT | Mod: ,,, | Performed by: INTERNAL MEDICINE

## 2025-02-28 PROCEDURE — 88305 TISSUE EXAM BY PATHOLOGIST: CPT | Performed by: INTERNAL MEDICINE

## 2025-02-28 PROCEDURE — 25000003 PHARM REV CODE 250

## 2025-02-28 PROCEDURE — 80053 COMPREHEN METABOLIC PANEL: CPT | Performed by: STUDENT IN AN ORGANIZED HEALTH CARE EDUCATION/TRAINING PROGRAM

## 2025-02-28 RX ORDER — PROPOFOL 10 MG/ML
VIAL (ML) INTRAVENOUS
Status: COMPLETED
Start: 2025-02-28 | End: 2025-02-28

## 2025-02-28 RX ORDER — GLYCOPYRROLATE 0.2 MG/ML
INJECTION INTRAMUSCULAR; INTRAVENOUS
Status: COMPLETED
Start: 2025-02-28 | End: 2025-02-28

## 2025-02-28 RX ORDER — LIDOCAINE HYDROCHLORIDE 10 MG/ML
INJECTION, SOLUTION EPIDURAL; INFILTRATION; INTRACAUDAL; PERINEURAL
Status: DISCONTINUED
Start: 2025-02-28 | End: 2025-02-28 | Stop reason: HOSPADM

## 2025-02-28 RX ORDER — CIPROFLOXACIN 750 MG/1
750 TABLET, FILM COATED ORAL EVERY 12 HOURS
Qty: 14 TABLET | Refills: 0 | Status: SHIPPED | OUTPATIENT
Start: 2025-02-28 | End: 2025-03-07

## 2025-02-28 RX ORDER — GLUCAGON 1 MG
1 KIT INJECTION
Status: DISCONTINUED | OUTPATIENT
Start: 2025-02-28 | End: 2025-02-28 | Stop reason: HOSPADM

## 2025-02-28 RX ORDER — ONDANSETRON HYDROCHLORIDE 2 MG/ML
4 INJECTION, SOLUTION INTRAVENOUS ONCE AS NEEDED
Status: DISCONTINUED | OUTPATIENT
Start: 2025-02-28 | End: 2025-02-28 | Stop reason: HOSPADM

## 2025-02-28 RX ORDER — GLYCOPYRROLATE 0.2 MG/ML
INJECTION INTRAMUSCULAR; INTRAVENOUS
Status: DISCONTINUED | OUTPATIENT
Start: 2025-02-28 | End: 2025-02-28

## 2025-02-28 RX ORDER — LIDOCAINE HYDROCHLORIDE 20 MG/ML
INJECTION INTRAVENOUS
Status: DISCONTINUED | OUTPATIENT
Start: 2025-02-28 | End: 2025-02-28

## 2025-02-28 RX ORDER — PROPOFOL 10 MG/ML
VIAL (ML) INTRAVENOUS
Status: DISCONTINUED | OUTPATIENT
Start: 2025-02-28 | End: 2025-02-28

## 2025-02-28 RX ORDER — METRONIDAZOLE 500 MG/1
500 TABLET ORAL EVERY 8 HOURS
Qty: 21 TABLET | Refills: 0 | Status: SHIPPED | OUTPATIENT
Start: 2025-02-28 | End: 2025-03-07

## 2025-02-28 RX ADMIN — LIDOCAINE HYDROCHLORIDE 50 MG: 20 INJECTION INTRAVENOUS at 10:02

## 2025-02-28 RX ADMIN — METRONIDAZOLE 500 MG: 500 TABLET ORAL at 02:02

## 2025-02-28 RX ADMIN — GLYCOPYRROLATE 0.2 MG: 0.2 INJECTION INTRAMUSCULAR; INTRAVENOUS at 10:02

## 2025-02-28 RX ADMIN — BUPRENORPHINE 8 MG: 8 TABLET SUBLINGUAL at 12:02

## 2025-02-28 RX ADMIN — PANTOPRAZOLE SODIUM 40 MG: 40 INJECTION, POWDER, LYOPHILIZED, FOR SOLUTION INTRAVENOUS at 08:02

## 2025-02-28 RX ADMIN — NICOTINE 1 PATCH: 14 PATCH TRANSDERMAL at 08:02

## 2025-02-28 RX ADMIN — SODIUM CHLORIDE, SODIUM GLUCONATE, SODIUM ACETATE, POTASSIUM CHLORIDE AND MAGNESIUM CHLORIDE: 526; 502; 368; 37; 30 INJECTION, SOLUTION INTRAVENOUS at 10:02

## 2025-02-28 RX ADMIN — METRONIDAZOLE 500 MG: 500 TABLET ORAL at 05:02

## 2025-02-28 RX ADMIN — CIPROFLOXACIN 400 MG: 2 INJECTION, SOLUTION INTRAVENOUS at 02:02

## 2025-02-28 RX ADMIN — CIPROFOLXACIN 750 MG: 500 TABLET ORAL at 12:02

## 2025-02-28 RX ADMIN — PROPOFOL 400 MG: 10 INJECTION, EMULSION INTRAVENOUS at 10:02

## 2025-02-28 NOTE — DISCHARGE INSTRUCTIONS
Reviewed discharge instructions with pt, denies questions. IV removed without incident, Tele monitors notified of discharge.

## 2025-02-28 NOTE — ANESTHESIA POSTPROCEDURE EVALUATION
Anesthesia Post Evaluation    Patient: Juan Malagon    Procedure(s) Performed: Procedure(s) (LRB):  COLON (N/A)  COLONOSCOPY, WITH 1 OR MORE BIOPSIES    Final Anesthesia Type: general      Patient location during evaluation: GI PACU  Patient participation: Yes- Able to Participate  Level of consciousness: oriented and awake  Post-procedure vital signs: reviewed and stable  Pain management: adequate  Airway patency: patent    PONV status at discharge: No PONV  Anesthetic complications: no      Cardiovascular status: hemodynamically stable  Respiratory status: spontaneous ventilation and unassisted  Hydration status: euvolemic  Follow-up not needed.  Comments: [facility]              Vitals Value Taken Time   /95 02/28/25 11:45   Temp 36.5 °C (97.7 °F) 02/28/25 11:06   Pulse 92 02/28/25 11:45   Resp 15 02/28/25 11:45   SpO2 98 % 02/28/25 11:45         No case tracking events are documented in the log.      Pain/Edgar Score: Pain Rating Prior to Med Admin: 6 (2/27/2025  8:31 PM)  Pain Rating Post Med Admin: 0 (2/27/2025  9:31 PM)  Edgar Score: 10 (2/28/2025 11:45 AM)

## 2025-02-28 NOTE — PROCEDURES
Juan Malagon   MRN: [id number]   ADMISSION DATE: 2025  : 1970  AGE: 54 y.o.    PROCEDURE:  Colonoscopy with biopsy    DATE OF PROCEDURE:  2025     SURGEON: MARC HOPKINS    PREOPERATIVE DIAGNOSIS:  1.  Left-sided colitis   2. Rectal bleed.      POSTOPERATIVE DIAGNOSIS:  1.  Probable ileocolonic anastomosis.  2.  Patchy erythema, left colon rectum.  Biopsies obtained to rule out underlying inflammation.    3.  Internal hemorrhoids, grade 1-2.      HISTORY AND PHYSICAL:  As per preoperative note.      DESCRIPTION OF PROCEDURE:  Informed consent was obtained.  Patient was placed in left lateral position.  Sedation per anesthesia service.  Rectal exam was unremarkable.  Olympus video colonoscope was introduced into the rectum and was carefully advanced to the anastomotic site.  Quality of the preparation was satisfactory.   Patient tolerated the procedure well without any complications.    FINDINGS:  There was patchy erythema noted in left colon as well as rectum.  Biopsies were obtained to rule out any underlying inflammation.  There was some mucosal friability noted.  No evidence of ulceration.  Transverse colon appeared unremarkable.  There was a probable ileocolonic anastomosis noted which appeared to be unremarkable.    ESTIMATED BLOOD LOSS:  4-5 cc.    COMPLICATIONS:  None    RECOMMENDATIONS:  1.  Follow up biopsy results.    2. Okay to start patient on bland diet as tolerated  3. Recommend follow up at [facility] on discharge.      Addendum: Recommend complete 7-10 days course of antibiotic.  Can be discharged from GI standpoint if tolerates diet well and follow up at [facility].

## 2025-02-28 NOTE — TRANSFER OF CARE
Anesthesia Transfer of Care Note    Patient: Juan Malagon    Procedure(s) Performed: Procedure(s) (LRB):  COLON (N/A)  COLONOSCOPY, WITH 1 OR MORE BIOPSIES    Patient location: PACU    Anesthesia Type: general    Transport from OR: Transported from OR on room air with adequate spontaneous ventilation    Post pain: adequate analgesia    Post assessment: no apparent anesthetic complications    Post vital signs: stable    Level of consciousness: awake and alert    Nausea/Vomiting: no nausea/vomiting    Complications: none    Transfer of care protocol was followed

## 2025-02-28 NOTE — PROGRESS NOTES
Pharmacist Intervention IV to PO Note    Juan Malagon is a 54 y.o. male being treated with IV medication ciprofloxacin    Patient Data:    Vital Signs (Most Recent):  Temp: 97.7 °F (36.5 °C) (02/28/25 1003)  Pulse: (!) 47 (02/28/25 1003)  Resp: 16 (02/28/25 1003)  BP: (!) 158/78 (02/28/25 1004)  SpO2: 100 % (02/28/25 1003) Vital Signs (72h Range):  Temp:  [97.7 °F (36.5 °C)-98.9 °F (37.2 °C)]   Pulse:  [42-78]   Resp:  [16-18]   BP: (107-165)/(58-84)   SpO2:  [94 %-100 %]      CBC:  Recent Labs   Lab 02/25/25  1158 02/26/25  0542 02/27/25  0612   WBC 10.89 9.40 6.79   RBC 5.41 4.58* 4.35*   HGB 16.0 13.3* 12.8*   HCT 48.0 40.1* 38.0*    161 164   MCV 88.7 87.6 87.4   MCH 29.6 29.0 29.4   MCHC 33.3 33.2 33.7     CMP:     Recent Labs   Lab 02/25/25  1158 02/26/25  0542 02/27/25  0612   CALCIUM 9.1 8.5 8.4   ALBUMIN 4.1 3.2* 3.0*    137 140   K 3.8 3.5 3.8   CO2 28 25 26    106 108*   BUN 8.1* 6.3* 7.7*   CREATININE 0.85 0.73 0.76   ALKPHOS 94 72 67   ALT 7 <5 <5   AST 15 12 12   BILITOT 1.0 0.8 0.6       Dietary Orders:  Diet Orders            Diet NPO Except for: Medication, Sips with Medication: NPO starting at 02/28 0001            Based on the following criteria, this patient qualifies for intravenous to oral conversion:  [x] The patients gastrointestinal tract is functioning (tolerating medications via oral or enteral route for 24 hours and tolerating food or enteral feeds for 24 hours).  [x] The patient is hemodynamically stable for 24 hours (heart rate <100 beats per minute, systolic blood pressure >99 mm Hg, and respiratory rate <20 breaths per minute).  [x] The patient shows clinical improvement (afebrile for at least 24 hours and white blood cell count downtrending or normalized). Additionally, the patient must be non-neutropenic (absolute neutrophil count >500 cells/mm3).  [x] For antimicrobials, the patient has received IV therapy for at least 24 hours.    IV medication  ciprofloxacin will be changed to oral medication ciprofloxacin    Pharmacist's Name: Elisa Nixon  Pharmacist's Extension: [phone]  On multiple PO medications at present including PO metronidazole

## 2025-03-01 LAB
BACTERIA STL CULT: ABNORMAL
BACTERIA STL CULT: ABNORMAL

## 2025-03-05 LAB — PSYCHE PATHOLOGY RESULT: NORMAL

## 2025-06-30 ENCOUNTER — HOSPITAL ENCOUNTER (EMERGENCY)
Facility: HOSPITAL | Age: 55
Discharge: HOME | End: 2025-06-30
Attending: FAMILY MEDICINE
Payer: MEDICAID

## 2025-06-30 VITALS
WEIGHT: 127 LBS | TEMPERATURE: 98 F | OXYGEN SATURATION: 100 % | BODY MASS INDEX: 21.16 KG/M2 | HEART RATE: 64 BPM | SYSTOLIC BLOOD PRESSURE: 122 MMHG | RESPIRATION RATE: 20 BRPM | DIASTOLIC BLOOD PRESSURE: 80 MMHG | HEIGHT: 65 IN

## 2025-06-30 DIAGNOSIS — K59.00 CONSTIPATION, UNSPECIFIED CONSTIPATION TYPE: Primary | ICD-10-CM

## 2025-06-30 DIAGNOSIS — R10.9 ABDOMINAL PAIN: ICD-10-CM

## 2025-06-30 LAB
ALBUMIN SERPL-MCNC: 4.4 G/DL (ref 3.5–5)
ALBUMIN/GLOB SERPL: 1.2 RATIO (ref 1.1–2)
ALP SERPL-CCNC: 92 UNIT/L (ref 40–150)
ALT SERPL-CCNC: 8 UNIT/L (ref 0–55)
ANION GAP SERPL CALC-SCNC: 5 MEQ/L
AST SERPL-CCNC: 16 UNIT/L (ref 11–45)
BASOPHILS # BLD AUTO: 0.07 X10(3)/MCL
BASOPHILS NFR BLD AUTO: 1.2 %
BILIRUB SERPL-MCNC: 1 MG/DL
BUN SERPL-MCNC: 11.3 MG/DL (ref 8.4–25.7)
CALCIUM SERPL-MCNC: 9.7 MG/DL (ref 8.4–10.2)
CHLORIDE SERPL-SCNC: 105 MMOL/L (ref 98–107)
CO2 SERPL-SCNC: 30 MMOL/L (ref 22–29)
CREAT SERPL-MCNC: 0.77 MG/DL (ref 0.72–1.25)
CREAT/UREA NIT SERPL: 15
EOSINOPHIL # BLD AUTO: 0.04 X10(3)/MCL (ref 0–0.9)
EOSINOPHIL NFR BLD AUTO: 0.7 %
ERYTHROCYTE [DISTWIDTH] IN BLOOD BY AUTOMATED COUNT: 13.4 % (ref 11.5–17)
FLUAV AG UPPER RESP QL IA.RAPID: NOT DETECTED
FLUBV AG UPPER RESP QL IA.RAPID: NOT DETECTED
GFR SERPLBLD CREATININE-BSD FMLA CKD-EPI: >60 ML/MIN/1.73/M2
GLOBULIN SER-MCNC: 3.7 GM/DL (ref 2.4–3.5)
GLUCOSE SERPL-MCNC: 81 MG/DL (ref 74–100)
HCT VFR BLD AUTO: 49.2 % (ref 42–52)
HGB BLD-MCNC: 16.2 G/DL (ref 14–18)
HOLD SPECIMEN: NORMAL
HOLD SPECIMEN: NORMAL
IMM GRANULOCYTES # BLD AUTO: 0.01 X10(3)/MCL (ref 0–0.04)
IMM GRANULOCYTES NFR BLD AUTO: 0.2 %
LYMPHOCYTES # BLD AUTO: 2.15 X10(3)/MCL (ref 0.6–4.6)
LYMPHOCYTES NFR BLD AUTO: 36.8 %
MCH RBC QN AUTO: 29.2 PG (ref 27–31)
MCHC RBC AUTO-ENTMCNC: 32.9 G/DL (ref 33–36)
MCV RBC AUTO: 88.8 FL (ref 80–94)
MONOCYTES # BLD AUTO: 0.39 X10(3)/MCL (ref 0.1–1.3)
MONOCYTES NFR BLD AUTO: 6.7 %
NEUTROPHILS # BLD AUTO: 3.18 X10(3)/MCL (ref 2.1–9.2)
NEUTROPHILS NFR BLD AUTO: 54.4 %
NRBC BLD AUTO-RTO: 0 %
PLATELET # BLD AUTO: 233 X10(3)/MCL (ref 130–400)
PMV BLD AUTO: 9.6 FL (ref 7.4–10.4)
POTASSIUM SERPL-SCNC: 4.5 MMOL/L (ref 3.5–5.1)
PROT SERPL-MCNC: 8.1 GM/DL (ref 6.4–8.3)
RBC # BLD AUTO: 5.54 X10(6)/MCL (ref 4.7–6.1)
RSV A 5' UTR RNA NPH QL NAA+PROBE: NOT DETECTED
SARS-COV-2 RNA RESP QL NAA+PROBE: NOT DETECTED
SODIUM SERPL-SCNC: 140 MMOL/L (ref 136–145)
WBC # BLD AUTO: 5.84 X10(3)/MCL (ref 4.5–11.5)

## 2025-06-30 PROCEDURE — 85025 COMPLETE CBC W/AUTO DIFF WBC: CPT | Performed by: FAMILY MEDICINE

## 2025-06-30 PROCEDURE — 80053 COMPREHEN METABOLIC PANEL: CPT | Performed by: FAMILY MEDICINE

## 2025-06-30 PROCEDURE — 99284 EMERGENCY DEPT VISIT MOD MDM: CPT | Mod: 25

## 2025-06-30 PROCEDURE — 0241U COVID/RSV/FLU A&B PCR: CPT | Performed by: FAMILY MEDICINE

## 2025-06-30 RX ORDER — LACTULOSE 10 G/10G
20 SOLUTION ORAL 3 TIMES DAILY
Qty: 30 PACKET | Refills: 1 | Status: SHIPPED | OUTPATIENT
Start: 2025-06-30

## 2025-06-30 RX ORDER — LACTULOSE 10 G/10G
20 SOLUTION ORAL 3 TIMES DAILY
Qty: 30 PACKET | Refills: 1 | Status: SHIPPED | OUTPATIENT
Start: 2025-06-30 | End: 2025-06-30

## 2025-06-30 NOTE — ED PROVIDER NOTES
Encounter Date: 6/30/2025       History     Chief Complaint   Patient presents with    Abdominal Pain     Generalized abd pain, n/v , hot flashes x1 week. Hx of colitis.      54-year-old complains of some generalized abdominal pain constipation for a couple of days also had some hot flashes no vomiting vital signs are stable physical exam is unremarkable abdomen no rebound no guarding no fevers or chills x-ray shows large stool volume most likely chronic constipation patient said he does smoke weed on a regular basis said this can contribute to his nausea we will going to give him some medicines kind of this patient and refer [facility] to [facility] to establish care as he says he does have the medical car but does not have a PCP requesting to see someone here        Review of patient's allergies indicates:   Allergen Reactions    Codeine     Naproxen      Rash, sob    Penicillins     Tetracycline      Other reaction(s): O/E - respiratory distress     Past Medical History:   Diagnosis Date    Depression     GERD (gastroesophageal reflux disease)     Other specified noninfective gastroenteritis and colitis      Past Surgical History:   Procedure Laterality Date    ABDOMINAL SURGERY      APPENDECTOMY      COLONOSCOPY N/A 2/28/2025    Procedure: COLON;  Surgeon: Andriy Collado MD;  Location: [facility] ENDOSCOPY;  Service: Gastroenterology;  Laterality: N/A;    COLONOSCOPY, WITH 1 OR MORE BIOPSIES  2/28/2025    Procedure: COLONOSCOPY, WITH 1 OR MORE BIOPSIES;  Surgeon: Andriy Collado MD;  Location: [facility] ENDOSCOPY;  Service: Gastroenterology;;    finger amputations Right      No family history on file.  Social History[1]  Review of Systems   Gastrointestinal:  Positive for abdominal pain and constipation.   All other systems reviewed and are negative.      Physical Exam     Initial Vitals [06/30/25 1008]   BP Pulse Resp Temp SpO2   129/86 73 20 98 °F (36.7 °C) 100 %      MAP       --         Physical Exam    Vitals  reviewed.  Constitutional: He appears well-developed and well-nourished. He is active.   HENT:   Head: Normocephalic and atraumatic.   Eyes: Conjunctivae, EOM and lids are normal. Pupils are equal, round, and reactive to light.   Neck: Trachea normal and phonation normal. Neck supple. No thyroid mass present.   Normal range of motion.  Cardiovascular:  Normal rate, regular rhythm, normal heart sounds and normal pulses.           Pulmonary/Chest: Breath sounds normal.   Abdominal: Abdomen is soft. Bowel sounds are normal.   Musculoskeletal:         General: Normal range of motion.      Cervical back: Normal range of motion and neck supple.     Neurological: He is alert and oriented to person, place, and time. He has normal strength and normal reflexes.   Skin: Skin is warm and intact.   Psychiatric: He has a normal mood and affect. His speech is normal and behavior is normal. Judgment and thought content normal. Cognition and memory are normal.         ED Course   Procedures  Labs Reviewed   COMPREHENSIVE METABOLIC PANEL - Abnormal       Result Value    Sodium 140      Potassium 4.5      Chloride 105      CO2 30 (*)     Glucose 81      Blood Urea Nitrogen 11.3      Creatinine 0.77      Calcium 9.7      Protein Total 8.1      Albumin 4.4      Globulin 3.7 (*)     Albumin/Globulin Ratio 1.2      Bilirubin Total 1.0      ALP 92      ALT 8      AST 16      eGFR >60      Anion Gap 5.0      BUN/Creatinine Ratio 15     CBC WITH DIFFERENTIAL - Abnormal    WBC 5.84      RBC 5.54      Hgb 16.2      Hct 49.2      MCV 88.8      MCH 29.2      MCHC 32.9 (*)     RDW 13.4      Platelet 233      MPV 9.6      Neut % 54.4      Lymph % 36.8      Mono % 6.7      Eos % 0.7      Basophil % 1.2      Imm Grans % 0.2      Neut # 3.18      Lymph # 2.15      Mono # 0.39      Eos # 0.04      Baso # 0.07      Imm Gran # 0.01      NRBC% 0.0     CBC W/ AUTO DIFFERENTIAL    Narrative:     The following orders were created for panel order CBC Auto  Differential.  Procedure                               Abnormality         Status                     ---------                               -----------         ------                     CBC with Differential[[id number]]       Abnormal            Final result                 Please view results for these tests on the individual orders.   COVID/RSV/FLU A&B PCR   DRUG SCREEN, URINE (BEAKER)   URINALYSIS, REFLEX TO URINE CULTURE   EXTRA TUBES    Narrative:     The following orders were created for panel order EXTRA TUBES.  Procedure                               Abnormality         Status                     ---------                               -----------         ------                     Light Blue Top Hold[[id number]]                             In process                 Gold Top Hold[[id number]]                                   In process                   Please view results for these tests on the individual orders.   LIGHT BLUE TOP HOLD   GOLD TOP HOLD          Imaging Results              X-Ray Abdomen Flat And Erect (Final result)  Result time 06/30/25 11:52:42      Final result by Tatyana Bailon MD (06/30/25 11:52:42)                   Impression:      Large colonic stool volume without evidence of obstruction      Electronically signed by: Tatyana Bailon  Date:    06/30/2025  Time:    11:52               Narrative:    EXAMINATION:  XR ABDOMEN FLAT AND ERECT    CLINICAL HISTORY:  Unspecified abdominal pain    COMPARISON:  X-rays dated 06/05/2010    FINDINGS:  There is no free intraperitoneal air.  There is a large colonic stool volume.  There is no evidence of bowel obstruction.  The lung bases are clear.                                       Medications - No data to display  Medical Decision Making  54-year-old complains of some generalized abdominal pain constipation for a couple of days also had some hot flashes no vomiting vital signs are stable physical exam is unremarkable abdomen no  "rebound no guarding no fevers or chills x-ray shows large stool volume most likely chronic constipation patient said he does smoke weed on a regular basis said this can contribute to his nausea we will going to give him some medicines kind of this patient and refer [facility] to [facility] to establish care as he says he does have the medical car but does not have a PCP requesting to see someone here          Amount and/or Complexity of Data Reviewed  Labs: ordered.  Radiology: ordered and independent interpretation performed.    Risk  Prescription drug management.  Risk Details: Differential diagnosis chronic constipation poor diet decreased fiber intake                                      Clinical Impression:  Final diagnoses:  [R10.9] Abdominal pain  [K59.00] Constipation, unspecified constipation type (Primary)          ED Disposition Condition    Discharge Stable          ED Prescriptions       Medication Sig Dispense Start Date End Date Auth. Provider    lactulose (CEPHULAC) 20 gram Pack Take 1 packet (20 g total) by mouth 3 (three) times daily. 30 packet 6/30/2025 -- Nickolas Livingston MD          Follow-up Information       Follow up With Specialties Details Why Contact Info    [facility] Emergency Medicine  As needed [address] [place] [address] [phone]                   [1]   Social History  Tobacco Use    Smoking status: Every Day     Current packs/day: 0.50     Types: Cigarettes   Substance Use Topics    Alcohol use: Not Currently     Comment: in past ("over 1 year")    Drug use: Yes     Types: Marijuana        Nickolas Livingston MD  06/30/25 [id number]    "

## (undated) DEVICE — BAG LABGUARD BIOHAZARD 6X9IN

## (undated) DEVICE — DEFOAMER WATER SOLUBLE ENDO

## (undated) DEVICE — TIP SUCTION YANKAUER

## (undated) DEVICE — SOL IRRI STRL WATER 1000ML

## (undated) DEVICE — ADAPTER DUAL NSL LUER M-M 7FT

## (undated) DEVICE — TRAPEASE POLYP SINGLE 29-750

## (undated) DEVICE — LIFTER ELEVIEW 5X5.1X1.2IN

## (undated) DEVICE — FORCEP ALLIGATOR 2.8MM W/NDL

## (undated) DEVICE — KIT SURGICAL COLON .25 1.1OZ

## (undated) DEVICE — BLOCK BLOX BITE DENT RIM 54FR

## (undated) DEVICE — FORCEP BX LG CAP 2.8MMX240CM

## (undated) DEVICE — CONTAINER SPEC STRL PATH 4OZ

## (undated) DEVICE — UNDERPAD PROTECT PLUS 17X24IN

## (undated) DEVICE — KIT CANIST SUCTION 1200CC

## (undated) DEVICE — COLLECTION SPECIMEN NEPTUNE